# Patient Record
Sex: MALE | Race: WHITE | Employment: FULL TIME | URBAN - NONMETROPOLITAN AREA
[De-identification: names, ages, dates, MRNs, and addresses within clinical notes are randomized per-mention and may not be internally consistent; named-entity substitution may affect disease eponyms.]

---
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
## 2021-03-15 ENCOUNTER — HOSPITAL ENCOUNTER (EMERGENCY)
Age: 21
Discharge: HOME | End: 2021-03-15
Attending: EMERGENCY MEDICINE
Payer: COMMERCIAL

## 2021-03-15 VITALS
OXYGEN SATURATION: 98 % | HEART RATE: 88 BPM | TEMPERATURE: 98.2 F | BODY MASS INDEX: 21.67 KG/M2 | DIASTOLIC BLOOD PRESSURE: 74 MMHG | SYSTOLIC BLOOD PRESSURE: 129 MMHG | RESPIRATION RATE: 17 BRPM | WEIGHT: 160 LBS | HEIGHT: 72 IN

## 2021-03-15 DIAGNOSIS — H57.9 SENSATION OF FOREIGN BODY IN EYE: Primary | ICD-10-CM

## 2021-03-15 PROCEDURE — 2500000003 HC RX 250 WO HCPCS: Performed by: EMERGENCY MEDICINE

## 2021-03-15 PROCEDURE — 6370000000 HC RX 637 (ALT 250 FOR IP)

## 2021-03-15 PROCEDURE — 99284 EMERGENCY DEPT VISIT MOD MDM: CPT

## 2021-03-15 RX ORDER — GENTAMICIN SULFATE 3 MG/ML
SOLUTION/ DROPS OPHTHALMIC
Status: DISCONTINUED
Start: 2021-03-15 | End: 2021-03-15 | Stop reason: WASHOUT

## 2021-03-15 RX ORDER — PROPARACAINE HYDROCHLORIDE 5 MG/ML
1 SOLUTION/ DROPS OPHTHALMIC ONCE
Status: COMPLETED | OUTPATIENT
Start: 2021-03-15 | End: 2021-03-15

## 2021-03-15 RX ORDER — GENTAMICIN SULFATE 3 MG/ML
SOLUTION/ DROPS OPHTHALMIC
Status: COMPLETED
Start: 2021-03-15 | End: 2021-03-15

## 2021-03-15 RX ADMIN — GENTAMICIN SULFATE: 3 SOLUTION OPHTHALMIC at 21:55

## 2021-03-15 RX ADMIN — PROPARACAINE HYDROCHLORIDE 1 DROP: 5 SOLUTION/ DROPS OPHTHALMIC at 21:40

## 2021-03-15 ASSESSMENT — PAIN DESCRIPTION - LOCATION: LOCATION: EYE

## 2021-03-15 ASSESSMENT — PAIN SCALES - GENERAL: PAINLEVEL_OUTOF10: 4

## 2021-03-15 ASSESSMENT — VISUAL ACUITY: OU: 20/13

## 2021-03-15 NOTE — LETTER
[address]  [address] [place] [address] [address]  Phone: [phone]               March 15, 2021    Patient: Robinson Robertson   YOB: 2000   Date of Visit: 3/15/2021       To Whom It May Concern:    Shaila Hawk was seen and treated in our emergency department on 3/15/2021. He may return to work on 3/17/21.       Sincerely,       Sunshine Min RN         Signature:__________________________________

## 2021-03-16 NOTE — ED NOTES
Pt presents to the front window with complaints of left eye possible foreign body. 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 Slight redness noted.       Gabriela Espino RN  03/15/21 2133

## 2021-03-16 NOTE — ED PROVIDER NOTES
[address]  [address] [address]  Phone: Joseerikakiel 12 COMPLAINT    Chief Complaint   Patient presents with    Foreign Body in Eye     left eye issue. was hammering apoxy on concrete and something flew into his eye       HPI    Sharon Mathis is a 24 y.o. male who presents above-noted complaint. Patient was doing fine. He was having some epoxy on concrete and something flew up into his eye.   pain  to his left eye. Denies photophobia vision pains or other issues although has pain and discomfort to that eye. They subsequently use some saline to wash it out. Denies nausea vomiting    PAST MEDICAL HISTORY    History reviewed. No pertinent past medical history. SURGICAL HISTORY    Past Surgical History:   Procedure Laterality Date    SHOULDER SURGERY      TESTICLE SURGERY         CURRENT MEDICATIONS    Current Outpatient Rx   Medication Sig Dispense Refill    acetaminophen (TYLENOL) 325 MG tablet Take 325 mg by mouth as needed for Pain      ibuprofen (ADVIL;MOTRIN) 200 MG tablet Take 200 mg by mouth every 6 hours as needed for Pain         ALLERGIES    Allergies   Allergen Reactions    Zithromax [Azithromycin Dihydrate] Hives       FAMILY HISTORY    History reviewed. No pertinent family history.     SOCIAL HISTORY    Social History     Socioeconomic History    Marital status: Single     Spouse name: None    Number of children: None    Years of education: None    Highest education level: None   Occupational History    None   Social Needs    Financial resource strain: None    Food insecurity     Worry: None     Inability: None    Transportation needs     Medical: None     Non-medical: None   Tobacco Use    Smoking status: Never Smoker    Smokeless tobacco: Never Used   Substance and Sexual Activity    Alcohol use: No    Drug use: No    Sexual activity: None   Lifestyle    Physical activity     Days per week: None     Minutes per session: None    Stress: None   Relationships    Social connections     Talks on phone: None     Gets together: None     Attends Oriental orthodox service: None     Active member of club or organization: None     Attends meetings of clubs or organizations: None     Relationship status: None    Intimate partner violence     Fear of current or ex partner: None     Emotionally abused: None     Physically abused: None     Forced sexual activity: None   Other Topics Concern    None   Social History Narrative    None       REVIEW OF SYSTEMS    Positive for eye injury. No neck pain chest pain other trauma. All systems negative except as marked. PHYSICAL EXAM    VITAL SIGNS: /74   Pulse 88   Temp 98.2 °F (36.8 °C)   Resp 17   Ht 6' (1.829 m)   Wt 160 lb (72.6 kg)   SpO2 98%   BMI 21.70 kg/m²    Constitutional:  Alert not toxtic or ill, able to give coherent history  HENT:  Normocephalic, Atraumatic  Cervical Spine: Normal range of motion,  No stridor. Eyes:  No discharge or  Swelling, slight conjunctival injection the left eye. Extraocular movements are intact. Respiratory: No respiratory distress  Musculoskeletal:  Intact distal pulses, No edema, No tenderness, No cyanosis, No clubbing. Good range of motion in all major joints. No tenderness to palpation or major deformities noted. Integument:  Warm, Dry, No erythema, No rash (on exposed areas)   Neurologic:  Alert & appropriate   Psychiatric:  Affect normal    EKG                      RADIOLOGY    No orders to display       PROCEDURES    Ophthalmic was placed in the left eye. Complete relief of all symptoms. Fluorescein staining was negative; Alex's test is negative    CONSULTS:  None        ED COURSE & MEDICAL DECISION MAKING    Pertinent Labs & Imaging studies reviewed. (See chart for details)  Patient has left eye foreign body sensation. Clinical exam is otherwise benign.   No other focal findings on clinical exam.  He did irrigate his eye after the injury. He has no acute vision loss or other issues at this time. Counseled in regards to the foreign body. Possible other trauma. He has no signs of bleeding. Always worried about globe injury. Alex's test is negative. I would like to have close follow-up with ophthalmology tomorrow and reassess him to assure there is no other intraorbital issues although appears to be more superficial.  We will use gentamicin prophylactically. SCREENINGS  /74   Pulse 88   Temp 98.2 °F (36.8 °C)   Resp 17   Ht 6' (1.829 m)   Wt 160 lb (72.6 kg)   SpO2 98%   BMI 21.70 kg/m²      No orders to display       Screening For Hypertension and Follow-up (#317)  patient informed that blood pressure is normal but should always be re-assessed by primary care      Screening For Tobacco Use and Cessation Intervention (#226):   reports that he has never smoked. He has never used smokeless tobacco.  Non-smoker not applicable for screen    FINAL IMPRESSION    1.  Sensation of foreign body in eye         PATIENT REFERRED TO:  Nina Veloz MD  [address] [place] [id number] [phone]    Call in 1 day        DISCHARGE MEDICATIONS:  New Prescriptions    No medications on file           Laly Cazares MD  03/15/21 1495

## 2021-03-16 NOTE — ED NOTES
Discharge teaching and instructions for condition explained to patient. AVS reviewed. Went over prescriptions with patient. Patient voiced understanding regarding prescriptions, follow up appointments and care of self at home. Pt discharged to home in stable condition per mother's care.      Tra Hussein RN  03/15/21 4378

## 2021-06-01 ENCOUNTER — HOSPITAL ENCOUNTER (EMERGENCY)
Age: 21
Discharge: HOME | End: 2021-06-01
Payer: COMMERCIAL

## 2021-06-01 ENCOUNTER — APPOINTMENT (OUTPATIENT)
Dept: GENERAL RADIOLOGY | Age: 21
End: 2021-06-01
Payer: COMMERCIAL

## 2021-06-01 VITALS
DIASTOLIC BLOOD PRESSURE: 77 MMHG | BODY MASS INDEX: 21.7 KG/M2 | TEMPERATURE: 98.3 F | OXYGEN SATURATION: 100 % | HEART RATE: 78 BPM | SYSTOLIC BLOOD PRESSURE: 130 MMHG | WEIGHT: 160 LBS | RESPIRATION RATE: 18 BRPM

## 2021-06-01 DIAGNOSIS — S61.412A LACERATION OF LEFT HAND WITHOUT FOREIGN BODY, INITIAL ENCOUNTER: Primary | ICD-10-CM

## 2021-06-01 PROCEDURE — 73130 X-RAY EXAM OF HAND: CPT

## 2021-06-01 PROCEDURE — 90471 IMMUNIZATION ADMIN: CPT | Performed by: NURSE PRACTITIONER

## 2021-06-01 PROCEDURE — 90715 TDAP VACCINE 7 YRS/> IM: CPT | Performed by: NURSE PRACTITIONER

## 2021-06-01 PROCEDURE — 6360000002 HC RX W HCPCS: Performed by: NURSE PRACTITIONER

## 2021-06-01 PROCEDURE — 99282 EMERGENCY DEPT VISIT SF MDM: CPT

## 2021-06-01 RX ORDER — LIDOCAINE HYDROCHLORIDE 10 MG/ML
INJECTION, SOLUTION INFILTRATION; PERINEURAL
Status: DISCONTINUED
Start: 2021-06-01 | End: 2021-06-01 | Stop reason: HOSPADM

## 2021-06-01 RX ORDER — CEPHALEXIN 500 MG/1
500 CAPSULE ORAL 4 TIMES DAILY
Qty: 28 CAPSULE | Refills: 0 | Status: SHIPPED | OUTPATIENT
Start: 2021-06-01 | End: 2021-06-08

## 2021-06-01 RX ADMIN — TETANUS TOXOID, REDUCED DIPHTHERIA TOXOID AND ACELLULAR PERTUSSIS VACCINE, ADSORBED 0.5 ML: 5; 2.5; 8; 8; 2.5 SUSPENSION INTRAMUSCULAR at 09:42

## 2021-06-01 ASSESSMENT — PAIN DESCRIPTION - ORIENTATION: ORIENTATION: LEFT

## 2021-06-01 ASSESSMENT — PAIN DESCRIPTION - PAIN TYPE: TYPE: ACUTE PAIN

## 2021-06-01 ASSESSMENT — ENCOUNTER SYMPTOMS: COLOR CHANGE: 0

## 2021-06-01 ASSESSMENT — PAIN SCALES - GENERAL: PAINLEVEL_OUTOF10: 3

## 2021-06-01 ASSESSMENT — PAIN DESCRIPTION - LOCATION: LOCATION: HAND

## 2021-06-01 NOTE — ED PROVIDER NOTES
[facility] Emergency Department    CHIEF COMPLAINT       Chief Complaint   Patient presents with    Laceration     left hand     Nurses Notes reviewed and I agree except as noted in the HPI. HISTORY OF PRESENT ILLNESS    Faisal Temple is a 22 y.o. male who presents to the ED for evaluation of a laceration to his left little finger that he sustained approximately 1 hour PTA. Pt states that he was pulling the winch cable on a four espinal when it slipped and cut the base of his finger. No significant pain to the hand or fingers. No other injuries. Last Tetanus was in 2014. Denies wrist pain, numbness, weakness, joint swelling, and any other complaints. Pain description:  Onset: acute   Location: left little finger   Duration: today, since approximately 1 hour PTA   Character: laceration  Aggravating factors: none   Radiation: none   Timing: acute   Severity: mild     Experienced previously: No    HPI was provided by the patient. REVIEW OF SYSTEMS     Review of Systems   Constitutional: Negative for chills and fever. Musculoskeletal: Negative for arthralgias and joint swelling. Skin: Positive for wound. Negative for color change. Neurological: Negative for weakness and numbness. Hematological: Does not bruise/bleed easily. PAST MEDICAL HISTORY   History reviewed. No pertinent past medical history. SURGICALHISTORY      has a past surgical history that includes Testicle surgery and shoulder surgery. CURRENT MEDICATIONS       Discharge Medication List as of 6/1/2021 10:45 AM      CONTINUE these medications which have NOT CHANGED    Details   acetaminophen (TYLENOL) 325 MG tablet Take 325 mg by mouth as needed for PainHistorical Med      ibuprofen (ADVIL;MOTRIN) 200 MG tablet Take 200 mg by mouth every 6 hours as needed for PainHistorical Med             ALLERGIES     is allergic to zithromax [azithromycin dihydrate]. FAMILY HISTORY     has no family status information on file. family history is not on file. SOCIAL HISTORY       Social History     Socioeconomic History    Marital status: Single     Spouse name: Not on file    Number of children: Not on file    Years of education: Not on file    Highest education level: Not on file   Occupational History    Not on file   Tobacco Use    Smoking status: Never Smoker    Smokeless tobacco: Never Used   Substance and Sexual Activity    Alcohol use: No    Drug use: No    Sexual activity: Not on file   Other Topics Concern    Not on file   Social History Narrative    Not on file     Social Determinants of Health     Financial Resource Strain:     Difficulty of Paying Living Expenses:    Food Insecurity:     Worried About Running Out of Food in the Last Year:     [address] in the Last Year:    Transportation Needs:     Lack of Transportation (Medical):  Lack of Transportation (Non-Medical):    Physical Activity:     Days of Exercise per Week:     Minutes of Exercise per Session:    Stress:     Feeling of Stress :    Social Connections:     Frequency of Communication with Friends and Family:     Frequency of Social Gatherings with Friends and Family:     Attends Sikh Services:     Active Member of Clubs or Organizations:     Attends Club or Organization Meetings:     Marital Status:    Intimate Partner Violence:     Fear of Current or Ex-Partner:     Emotionally Abused:     Physically Abused:     Sexually Abused:        PHYSICAL EXAM     INITIAL VITALS:  weight is 160 lb (72.6 kg). His oral temperature is 98.3 °F (36.8 °C). His blood pressure is 130/77 and his pulse is 78. His respiration is 18 and oxygen saturation is 100%. Physical Exam  Vitals and nursing note reviewed. Constitutional:       Appearance: Normal appearance. He is well-developed. HENT:      Head: Normocephalic.       Right Ear: External ear normal.      Left Ear: External ear normal.      Nose: Nose normal.   Eyes: Conjunctiva/sclera: Conjunctivae normal.   Cardiovascular:      Rate and Rhythm: Normal rate and regular rhythm. Heart sounds: S1 normal and S2 normal.   Pulmonary:      Effort: Pulmonary effort is normal. No respiratory distress. Musculoskeletal:         General: Normal range of motion. Cervical back: Normal range of motion and neck supple. Comments: No tenderness to fingers or wrist bilaterally. Pt is able to flex and extend left fifth digit fully and oppose digits on left hand. Neurovascularly intact. Skin:     General: Skin is warm. Capillary Refill: Capillary refill takes less than 2 seconds. Coloration: Skin is not pale. Findings: No rash. Comments: Approximately 1.5 cm superficial, linear laceration along the MCP joint of the left fifth digit, palmar aspect. Neurological:      General: No focal deficit present. Mental Status: He is alert. Psychiatric:         Behavior: Behavior normal.         Thought Content: Thought content normal.         Judgment: Judgment normal.       DIFFERENTIAL DIAGNOSIS:   Laceration, abrasion, tendon laceration, foreign body    DIAGNOSTIC RESULTS     RADIOLOGY: non-plainfilm images(s) such as CT, Ultrasound and MRI are read by the radiologist.  Plain radiographic images are visualized and preliminarily interpreted by the emergency physician unless otherwise stated below. XR HAND LEFT (MIN 3 VIEWS)   Final Result   Soft tissue laceration. Possible small radiopaque foreign bodies at the site of the laceration. **This report has been created using voice recognition software. It may contain minor errors which are inherent in voice recognition technology. **      Final report electronically signed by Dr. Los Conrad on 6/1/2021 9:37 AM            LABS:   Labs Reviewed - No data to display    EMERGENCY DEPARTMENT COURSE:   Vitals:    Vitals:    06/01/21 0906 06/01/21 0911   BP: 130/77    Pulse: 78    Resp: 18    Temp:  98.3 °F (36.8 °C)   TempSrc:  Oral   SpO2: 100%    Weight: 160 lb (72.6 kg)        MDM    Patient was seen and evaluated in the emergency department, patient appeared to be in no acute distress, vital signs were reviewed, no significant findings noted. Physical exam was completed, there is a 1.5 cm laceration to the base of the left little finger, there is no decreased range of motion, no sensory deficits noted, wound is visibly soiled. Patient was asked to rinse off and the sink, the wound was then soaked in a Betadine peroxide water mixture. X-rays were reviewed, possible foreign bodies on the surface noted. Wound was further cleaned. See the procedure note below as sutures were used to close the wound. Patient tolerated this well. Tetanus was updated, patient replaced on Keflex for prophylaxis. Advised to return to the emergency department new or worsening signs and symptoms specific to tenosynovitis. Patient verbalized understanding plan of care. Medications   Tetanus-Diphth-Acell Pertussis (BOOSTRIX) injection 0.5 mL (0.5 mLs Intramuscular Given 6/1/21 0942)       Patient was seenindependently by myself. The patient's final impression and disposition and plan was determined by myself. CRITICAL CARE:   None    CONSULTS:  None    PROCEDURES:  Lac Repair    Date/Time: 6/1/2021 10:00 AM  Performed by: MARY Jon CNP  Authorized by: MARY Jon CNP     Consent:     Consent given by:  Patient    Risks discussed:  Pain, infection, retained foreign body, need for additional repair, tendon damage, nerve damage and poor wound healing    Alternatives discussed:  No treatment and delayed treatment  Anesthesia (see MAR for exact dosages):      Anesthesia method:  Local infiltration    Local anesthetic:  Lidocaine 1% w/o epi  Laceration details:     Location:  Hand    Hand location:  L palm    Length (cm):  1.5    Depth (mm):  3  Repair type:     Repair type:  Simple  Pre-procedure details:

## 2021-06-01 NOTE — ED NOTES
Pt to the ED with complaints of a laceration on his left hand. Pt states that at 0830 this morning he was pulling on a wench cable and his hand slipped, causing the laceration to his hand. Pt states that blood initially was dripping out of the wound, but bleeding is controlled at this time.       Breann Manuel RN  06/01/21 5326

## 2022-01-04 ENCOUNTER — HOSPITAL ENCOUNTER (EMERGENCY)
Age: 22
Discharge: HOME | End: 2022-01-04
Attending: EMERGENCY MEDICINE
Payer: COMMERCIAL

## 2022-01-04 VITALS
DIASTOLIC BLOOD PRESSURE: 87 MMHG | HEART RATE: 100 BPM | HEIGHT: 73 IN | SYSTOLIC BLOOD PRESSURE: 148 MMHG | OXYGEN SATURATION: 98 % | WEIGHT: 180 LBS | BODY MASS INDEX: 23.86 KG/M2 | RESPIRATION RATE: 16 BRPM | TEMPERATURE: 98.4 F

## 2022-01-04 DIAGNOSIS — J20.9 ACUTE BRONCHITIS DUE TO INFECTION: Primary | ICD-10-CM

## 2022-01-04 PROCEDURE — 99213 OFFICE O/P EST LOW 20 MIN: CPT

## 2022-01-04 PROCEDURE — 99213 OFFICE O/P EST LOW 20 MIN: CPT | Performed by: EMERGENCY MEDICINE

## 2022-01-04 PROCEDURE — G0463 HOSPITAL OUTPT CLINIC VISIT: HCPCS

## 2022-01-04 RX ORDER — DOXYCYCLINE HYCLATE 100 MG
100 TABLET ORAL 2 TIMES DAILY
Qty: 14 TABLET | Refills: 0 | Status: SHIPPED | OUTPATIENT
Start: 2022-01-04 | End: 2022-01-11

## 2022-01-04 RX ORDER — DEXTROMETHORPHAN HYDROBROMIDE AND PROMETHAZINE HYDROCHLORIDE 15; 6.25 MG/5ML; MG/5ML
5 SYRUP ORAL 4 TIMES DAILY PRN
Qty: 120 ML | Refills: 0 | Status: SHIPPED | OUTPATIENT
Start: 2022-01-04 | End: 2022-01-08

## 2022-01-04 ASSESSMENT — ENCOUNTER SYMPTOMS
NAUSEA: 0
SINUS PRESSURE: 0
TROUBLE SWALLOWING: 0
EYE DISCHARGE: 0
ROS SKIN COMMENTS: NO RASH OR BRUISING
COUGH: 0
VOMITING: 0
EYE REDNESS: 0
VOICE CHANGE: 0
DIARRHEA: 0
STRIDOR: 0
WHEEZING: 0
BACK PAIN: 0
SORE THROAT: 1
ABDOMINAL PAIN: 0
SHORTNESS OF BREATH: 0
EYE PAIN: 0

## 2022-01-04 NOTE — ED NOTES
To [facility] with complaints of cough, fever of 99 at home, and chest pain with deep breathing/cough.  Started 1/1     Andrzej Rizo RN  01/04/22 2182

## 2022-01-04 NOTE — ED PROVIDER NOTES
[id number]       Chief Complaint   Patient presents with    Cough    Fever    Chest Pain     when coughing or deep breathin       Nurses Notes reviewed and I agree except as noted in the HPI. HISTORY OF PRESENT ILLNESS   Marissa Verma is a 22 y.o. male who presents with 3-week history of cough productive of minimal sputum, lateral chest discomfort with coughing, fatigue, fever to 100. No shortness of breath, wheezing, stridor, abdominal pain, vomiting, hemoptysis, dizziness, syncope, mopped assist, rash, diarrhea,  symptoms. No history of asthma or pneumonia. Non-smoker  REVIEW OF SYSTEMS     Review of Systems   Constitutional: Positive for appetite change, fatigue and fever. Negative for chills and unexpected weight change. Decreased appetite fever 100 fatigue   HENT: Positive for congestion, postnasal drip and sore throat. Negative for ear discharge, ear pain, sinus pressure, sneezing, trouble swallowing and voice change. Congestion, scratchy throat   Eyes: Negative for pain, discharge and redness. No erythema or discharge   Respiratory: Negative for cough, shortness of breath, wheezing and stridor. Cough no shortness of breath or wheezing   Cardiovascular: Negative for chest pain and leg swelling. No chest pain or syncope   Gastrointestinal: Negative for abdominal pain, diarrhea, nausea and vomiting. No Abdominal pain or vomiting   Genitourinary: Negative for dysuria, frequency, hematuria and urgency. Musculoskeletal: Negative for arthralgias, back pain, myalgias and neck pain. Skin: Negative for rash. No rash or bruising   Neurological: Negative for dizziness, syncope, weakness and headaches. No headache   Hematological: Negative for adenopathy. Psychiatric/Behavioral: Negative for behavioral problems, confusion, sleep disturbance and suicidal ideas.  The patient is not nervous/anxious. Red and bold elements reviewed    PAST MEDICAL HISTORY   History reviewed. No pertinent past medical history. SURGICAL HISTORY     Patient  has a past surgical history that includes Testicle surgery and shoulder surgery. CURRENT MEDICATIONS       Discharge Medication List as of 1/4/2022 12:44 PM      CONTINUE these medications which have NOT CHANGED    Details   acetaminophen (TYLENOL) 325 MG tablet Take 325 mg by mouth as needed for PainHistorical Med      ibuprofen (ADVIL;MOTRIN) 200 MG tablet Take 200 mg by mouth every 6 hours as needed for PainHistorical Med             ALLERGIES     Patient is is allergic to zithromax [azithromycin dihydrate]. FAMILY HISTORY     Patient'sfamily history is not on file. SOCIAL HISTORY     Patient  reports that he has never smoked. He has never used smokeless tobacco. He reports that he does not drink alcohol and does not use drugs. PHYSICAL EXAM     ED TRIAGE VITALS  BP: (!) 148/87, Temp: 98.4 °F (36.9 °C), Pulse: 100, Resp: 16, SpO2: 98 %  Physical Exam  Vitals and nursing note reviewed. Constitutional:       General: He is not in acute distress. Appearance: He is well-developed. He is not ill-appearing. Comments: Dry cough, moist membranes, normal voice   HENT:      Head: Normocephalic and atraumatic. Right Ear: Tympanic membrane and external ear normal.      Left Ear: Tympanic membrane and external ear normal.      Nose: Nose normal.      Mouth/Throat:      Pharynx: No oropharyngeal exudate. Comments: Oropharynx normal  Eyes:      General: No scleral icterus. Right eye: No discharge. Left eye: No discharge. Extraocular Movements:      Right eye: Normal extraocular motion. Left eye: Normal extraocular motion. Conjunctiva/sclera: Conjunctivae normal.      Pupils: Pupils are equal, round, and reactive to light. Comments: Conjunctiva clear   Neck:      Thyroid: No thyromegaly.       Vascular: No JVD. Comments: No meningismus  Cardiovascular:      Rate and Rhythm: Normal rate and regular rhythm. Pulses: Normal pulses. Heart sounds: Normal heart sounds, S1 normal and S2 normal. No murmur heard. No friction rub. No gallop. Comments: No murmur  Pulmonary:      Effort: Pulmonary effort is normal. No tachypnea or respiratory distress. Breath sounds: No stridor. Rhonchi present. No decreased breath sounds, wheezing or rales. Comments: Dry cough diffuse rhonchi  Chest:      Chest wall: No tenderness. Abdominal:      General: Bowel sounds are normal. There is no distension. Palpations: Abdomen is soft. There is no mass. Tenderness: There is no abdominal tenderness. There is no guarding or rebound. Comments: Soft nontender   Musculoskeletal:         General: No tenderness. Normal range of motion. Cervical back: Normal range of motion. Comments: Extremities normal   Lymphadenopathy:      Cervical: No cervical adenopathy. Right cervical: No superficial cervical adenopathy. Left cervical: No superficial cervical adenopathy. Skin:     General: Skin is warm and dry. Findings: No erythema or rash. Comments: No rash or bruising   Neurological:      Mental Status: He is alert and oriented to person, place, and time. Cranial Nerves: No cranial nerve deficit. Motor: No abnormal muscle tone. Coordination: Coordination normal.      Deep Tendon Reflexes: Reflexes are normal and symmetric. Reflexes normal.      Comments: Appropriate, no focal finding   Psychiatric:         Behavior: Behavior normal.         Thought Content: Thought content normal.         Judgment: Judgment normal.         DIAGNOSTIC RESULTS   Labs: No results found for this visit on 01/04/22.     IMAGING:  No orders to display     URGENT CARE COURSE:     Vitals:    01/04/22 1155   BP: (!) 148/87   Pulse: 100   Resp: 16   Temp: 98.4 °F (36.9 °C)   TempSrc: Temporal SpO2: 98%   Weight: 180 lb (81.6 kg)   Height: 6' 1\" (1.854 m)       Medications - No data to display  PROCEDURES:  None  FINALIMPRESSION      1. Acute bronchitis due to infection        DISPOSITION/PLAN   DISPOSITION Decision To Discharge 01/04/2022 12:41:34 PM  Nontoxic, well-hydrated, normal airway. No airway abscess or epiglottitis, sepsis, CNS infection, pneumonia, hypoxia or bronchospasm. Patient has acute bronchitis. Will treat with doxycycline, Phenergan DM, Tylenol, increased oral clear liquids, vaporizer, rest.  Patient to follow-up with PCP in 6 days if problems persist, and he understands to go to ED if worse.   PATIENT REFERRED TO:  Zac [address]  [address]  [phone]    Schedule an appointment as soon as possible for a visit in 6 days  Recheck if problems persist, go to emergency if worse    DISCHARGE MEDICATIONS:  Discharge Medication List as of 1/4/2022 12:44 PM      START taking these medications    Details   doxycycline hyclate (VIBRA-TABS) 100 MG tablet Take 1 tablet by mouth 2 times daily for 7 days, Disp-14 tablet, R-0Print      promethazine-dextromethorphan (PROMETHAZINE-DM) 6.25-15 MG/5ML syrup Take 5 mLs by mouth 4 times daily as needed for Cough Caution will cause drowsiness do not take at work, Disp-120 mL, R-0Print           Discharge Medication List as of 1/4/2022 12:44 PM          MD Travis Kamara MD  01/04/22 63 Krystle Judge MD  01/04/22 8826

## 2022-05-03 ENCOUNTER — TELEPHONE (OUTPATIENT)
Dept: FAMILY MEDICINE CLINIC | Age: 22
End: 2022-05-03

## 2022-05-03 NOTE — TELEPHONE ENCOUNTER
----- Message from Kaycee Heller sent at 5/3/2022  3:02 PM EDT -----  Subject: Appointment Request    Reason for Call: New Patient Request Appointment    QUESTIONS  Type of Appointment? New Patient/New to Provider  Reason for appointment request? No appointments available during search  Additional Information for Provider? Would like to establish care with JR   Please advise if an appointment can be made. Mother is a patient currently   at office   ---------------------------------------------------------------------------  --------------  [id number] [employer]  What is the best way for the office to contact you? OK to leave message on   voicemail  Preferred Call Back Phone Number? [phone]  ---------------------------------------------------------------------------  --------------  SCRIPT ANSWERS  Relationship to Patient? Parent  Representative Name? Gina Mom   Additional information verified (besides Name and Date of Birth)? Phone   Number  Specialty Confirmation? Primary Care  Is this the first appointment to establish care for a ? No  Have you been diagnosed with, awaiting test results for, or told that you   are suspected of having COVID-19 (Coronavirus)? (If patient has tested   negative or was tested as a requirement for work, school, or travel and   not based on symptoms, answer no)? No  Within the past 10 days have you developed any of the following symptoms   (answer no if symptoms have been present longer than 10 days or began   more than 10 days ago)? Fever or Chills, Cough, Shortness of breath or   difficulty breathing, Loss of taste or smell, Sore throat, Nasal   congestion, Sneezing or runny nose, Fatigue or generalized body aches   (answer no if pain is specific to a body part e.g. back pain), Diarrhea,   Headache? No  Have you had close contact with someone with COVID-19 in the last 7 days? No  (Service Expert  click yes below to proceed with [facility]   Scheduling)?  Yes

## 2022-05-09 ENCOUNTER — OFFICE VISIT (OUTPATIENT)
Dept: FAMILY MEDICINE CLINIC | Age: 22
End: 2022-05-09
Payer: COMMERCIAL

## 2022-05-09 VITALS
OXYGEN SATURATION: 99 % | RESPIRATION RATE: 18 BRPM | DIASTOLIC BLOOD PRESSURE: 78 MMHG | HEIGHT: 72 IN | SYSTOLIC BLOOD PRESSURE: 144 MMHG | HEART RATE: 66 BPM | TEMPERATURE: 98.1 F | WEIGHT: 185 LBS | BODY MASS INDEX: 25.06 KG/M2

## 2022-05-09 DIAGNOSIS — I10 PRIMARY HYPERTENSION: Primary | ICD-10-CM

## 2022-05-09 DIAGNOSIS — Z13.1 SCREENING FOR DIABETES MELLITUS: ICD-10-CM

## 2022-05-09 DIAGNOSIS — Z13.220 SCREENING FOR LIPID DISORDERS: ICD-10-CM

## 2022-05-09 DIAGNOSIS — Z13.29 SCREENING FOR THYROID DISORDER: ICD-10-CM

## 2022-05-09 PROCEDURE — 99203 OFFICE O/P NEW LOW 30 MIN: CPT | Performed by: NURSE PRACTITIONER

## 2022-05-09 RX ORDER — LISINOPRIL 10 MG/1
10 TABLET ORAL DAILY
Qty: 30 TABLET | Refills: 3 | Status: SHIPPED | OUTPATIENT
Start: 2022-05-09 | End: 2022-05-31

## 2022-05-09 SDOH — ECONOMIC STABILITY: FOOD INSECURITY: WITHIN THE PAST 12 MONTHS, YOU WORRIED THAT YOUR FOOD WOULD RUN OUT BEFORE YOU GOT MONEY TO BUY MORE.: NEVER TRUE

## 2022-05-09 SDOH — ECONOMIC STABILITY: FOOD INSECURITY: WITHIN THE PAST 12 MONTHS, THE FOOD YOU BOUGHT JUST DIDN'T LAST AND YOU DIDN'T HAVE MONEY TO GET MORE.: NEVER TRUE

## 2022-05-09 ASSESSMENT — ENCOUNTER SYMPTOMS
BACK PAIN: 0
SORE THROAT: 0
ABDOMINAL PAIN: 0
NAUSEA: 0
STRIDOR: 0
SINUS PRESSURE: 0
DIARRHEA: 0
WHEEZING: 0
CHEST TIGHTNESS: 0
SHORTNESS OF BREATH: 0
ABDOMINAL DISTENTION: 0
CONSTIPATION: 0
VOMITING: 0
COUGH: 0
COLOR CHANGE: 0

## 2022-05-09 ASSESSMENT — PATIENT HEALTH QUESTIONNAIRE - PHQ9
SUM OF ALL RESPONSES TO PHQ9 QUESTIONS 1 & 2: 0
SUM OF ALL RESPONSES TO PHQ QUESTIONS 1-9: 0
1. LITTLE INTEREST OR PLEASURE IN DOING THINGS: 0
2. FEELING DOWN, DEPRESSED OR HOPELESS: 0
SUM OF ALL RESPONSES TO PHQ QUESTIONS 1-9: 0

## 2022-05-09 ASSESSMENT — SOCIAL DETERMINANTS OF HEALTH (SDOH): HOW HARD IS IT FOR YOU TO PAY FOR THE VERY BASICS LIKE FOOD, HOUSING, MEDICAL CARE, AND HEATING?: NOT HARD AT ALL

## 2022-05-09 NOTE — PROGRESS NOTES
Jesus Dukes (:  2000) is a 25 y.o. male,Established patient, here for evaluation of the following chief complaint(s):  Established New Doctor (elevated BP; tremor in Bilat UE )         ASSESSMENT/PLAN:  1. Primary hypertension  -     CBC with Auto Differential; Future  -     Comprehensive Metabolic Panel; Future  2. Screening for lipid disorders  -     Lipid Panel; Future  3. Screening for thyroid disorder  -     TSH with Reflex; Future  4. Screening for diabetes mellitus  -     Comprehensive Metabolic Panel; Future    Obtain blood work  Start lisinopril  Monitor BP    Return in about 6 weeks (around 2022). Subjective   SUBJECTIVE/OBJECTIVE:  HPI    Last seen Dr. Tomy Lovett  BP has been running high. No prior health problems. BP has been this way for a little while. No cp or sob. BP Readings from Last 3 Encounters:   22 (!) 144/78   22 (!) 148/87   21 130/77         Review of Systems   Constitutional: Negative for activity change, appetite change, chills, diaphoresis, fatigue, fever and unexpected weight change. HENT: Negative for congestion, ear pain, postnasal drip, sinus pressure and sore throat. Eyes: Negative for visual disturbance. Respiratory: Negative for cough, chest tightness, shortness of breath, wheezing and stridor. Cardiovascular: Negative for chest pain, palpitations and leg swelling. Gastrointestinal: Negative for abdominal distention, abdominal pain, constipation, diarrhea, nausea and vomiting. Endocrine: Negative for polydipsia, polyphagia and polyuria. Genitourinary: Negative for decreased urine volume, difficulty urinating, dysuria, flank pain, frequency, hematuria and urgency. Musculoskeletal: Negative for arthralgias, back pain, gait problem, joint swelling, myalgias and neck pain. Skin: Negative for color change, pallor and rash.    Neurological: Negative for dizziness, syncope, weakness, light-headedness, numbness and headaches. Hematological: Negative for adenopathy. Psychiatric/Behavioral: Negative for behavioral problems, self-injury and sleep disturbance. The patient is not nervous/anxious. Objective   Physical Exam  Vitals reviewed. Constitutional:       General: He is not in acute distress. Appearance: Normal appearance. He is well-developed. HENT:      Head: Normocephalic and atraumatic. Right Ear: Tympanic membrane and external ear normal.      Left Ear: Tympanic membrane and external ear normal.      Nose: Nose normal.      Right Sinus: No maxillary sinus tenderness. Left Sinus: No maxillary sinus tenderness. Mouth/Throat:      Mouth: Mucous membranes are moist.      Pharynx: Oropharynx is clear. Uvula midline. No oropharyngeal exudate or posterior oropharyngeal erythema. Neck:      Trachea: Trachea normal.   Cardiovascular:      Rate and Rhythm: Normal rate and regular rhythm. Heart sounds: Normal heart sounds. No murmur heard. Pulmonary:      Effort: Pulmonary effort is normal. No respiratory distress. Breath sounds: Normal breath sounds. No decreased breath sounds, wheezing, rhonchi or rales. Chest:      Chest wall: No tenderness. Abdominal:      General: There is no distension. Palpations: Abdomen is soft. There is no mass. Tenderness: There is no abdominal tenderness. Musculoskeletal:         General: No tenderness or deformity. Normal range of motion. Cervical back: Normal range of motion and neck supple. Lymphadenopathy:      Cervical: No cervical adenopathy. Skin:     General: Skin is warm and dry. Neurological:      Mental Status: He is alert and oriented to person, place, and time. Motor: No abnormal muscle tone. Coordination: Coordination normal.      Gait: Gait normal.   Psychiatric:         Mood and Affect: Mood normal.         Behavior: Behavior normal.         Thought Content:  Thought content normal. Judgment: Judgment normal.            On this date 5/9/2022 I have spent 35 minutes reviewing previous notes, test results and face to face with the patient discussing the diagnosis and importance of compliance with the treatment plan as well as documenting on the day of the visit. An electronic signature was used to authenticate this note.     --Barby Epstein, APRN - CNP

## 2022-05-10 ENCOUNTER — NURSE ONLY (OUTPATIENT)
Dept: FAMILY MEDICINE CLINIC | Age: 22
End: 2022-05-10
Payer: COMMERCIAL

## 2022-05-10 DIAGNOSIS — Z13.220 SCREENING FOR LIPID DISORDERS: ICD-10-CM

## 2022-05-10 DIAGNOSIS — Z13.29 SCREENING FOR THYROID DISORDER: ICD-10-CM

## 2022-05-10 DIAGNOSIS — I10 PRIMARY HYPERTENSION: ICD-10-CM

## 2022-05-10 DIAGNOSIS — Z13.1 SCREENING FOR DIABETES MELLITUS: ICD-10-CM

## 2022-05-10 LAB
ALBUMIN SERPL-MCNC: 5.5 G/DL (ref 3.5–5.1)
ALP BLD-CCNC: 102 U/L (ref 38–126)
ALT SERPL-CCNC: 12 U/L (ref 11–66)
ANION GAP SERPL CALCULATED.3IONS-SCNC: 13 MEQ/L (ref 8–16)
AST SERPL-CCNC: 21 U/L (ref 5–40)
BASOPHILS # BLD: 0.7 %
BASOPHILS ABSOLUTE: 0 THOU/MM3 (ref 0–0.1)
BILIRUB SERPL-MCNC: 0.7 MG/DL (ref 0.3–1.2)
BUN BLDV-MCNC: 15 MG/DL (ref 7–22)
CALCIUM SERPL-MCNC: 9.6 MG/DL (ref 8.5–10.5)
CHLORIDE BLD-SCNC: 100 MEQ/L (ref 98–111)
CHOLESTEROL, TOTAL: 180 MG/DL (ref 100–199)
CO2: 26 MEQ/L (ref 23–33)
CREAT SERPL-MCNC: 0.8 MG/DL (ref 0.4–1.2)
EOSINOPHIL # BLD: 1.3 %
EOSINOPHILS ABSOLUTE: 0.1 THOU/MM3 (ref 0–0.4)
ERYTHROCYTE [DISTWIDTH] IN BLOOD BY AUTOMATED COUNT: 13.4 % (ref 11.5–14.5)
ERYTHROCYTE [DISTWIDTH] IN BLOOD BY AUTOMATED COUNT: 43.4 FL (ref 35–45)
GFR SERPL CREATININE-BSD FRML MDRD: > 90 ML/MIN/1.73M2
GLUCOSE BLD-MCNC: 83 MG/DL (ref 70–108)
HCT VFR BLD CALC: 47.3 % (ref 42–52)
HDLC SERPL-MCNC: 51 MG/DL
HEMOGLOBIN: 14.9 GM/DL (ref 14–18)
IMMATURE GRANS (ABS): 0.02 THOU/MM3 (ref 0–0.07)
IMMATURE GRANULOCYTES: 0.3 %
LDL CHOLESTEROL CALCULATED: 106 MG/DL
LYMPHOCYTES # BLD: 29.1 %
LYMPHOCYTES ABSOLUTE: 2 THOU/MM3 (ref 1–4.8)
MCH RBC QN AUTO: 27.9 PG (ref 26–33)
MCHC RBC AUTO-ENTMCNC: 31.5 GM/DL (ref 32.2–35.5)
MCV RBC AUTO: 88.6 FL (ref 80–94)
MONOCYTES # BLD: 11.4 %
MONOCYTES ABSOLUTE: 0.8 THOU/MM3 (ref 0.4–1.3)
NUCLEATED RED BLOOD CELLS: 0 /100 WBC
PLATELET # BLD: 289 THOU/MM3 (ref 130–400)
PMV BLD AUTO: 9 FL (ref 9.4–12.4)
POTASSIUM SERPL-SCNC: 5 MEQ/L (ref 3.5–5.2)
RBC # BLD: 5.34 MILL/MM3 (ref 4.7–6.1)
SEG NEUTROPHILS: 57.2 %
SEGMENTED NEUTROPHILS ABSOLUTE COUNT: 4 THOU/MM3 (ref 1.8–7.7)
SODIUM BLD-SCNC: 139 MEQ/L (ref 135–145)
TOTAL PROTEIN: 8.1 G/DL (ref 6.1–8)
TRIGL SERPL-MCNC: 113 MG/DL (ref 0–199)
TSH SERPL DL<=0.05 MIU/L-ACNC: 2.12 UIU/ML (ref 0.4–4.2)
WBC # BLD: 7 THOU/MM3 (ref 4.8–10.8)

## 2022-05-10 PROCEDURE — 99999 PR OFFICE/OUTPT VISIT,PROCEDURE ONLY: CPT | Performed by: NURSE PRACTITIONER

## 2022-05-10 PROCEDURE — 36415 COLL VENOUS BLD VENIPUNCTURE: CPT | Performed by: NURSE PRACTITIONER

## 2022-05-25 ENCOUNTER — APPOINTMENT (OUTPATIENT)
Dept: GENERAL RADIOLOGY | Age: 22
End: 2022-05-25
Payer: COMMERCIAL

## 2022-05-25 ENCOUNTER — HOSPITAL ENCOUNTER (EMERGENCY)
Age: 22
Discharge: HOME | End: 2022-05-25
Payer: COMMERCIAL

## 2022-05-25 VITALS
SYSTOLIC BLOOD PRESSURE: 139 MMHG | RESPIRATION RATE: 16 BRPM | DIASTOLIC BLOOD PRESSURE: 83 MMHG | HEART RATE: 87 BPM | TEMPERATURE: 98 F | OXYGEN SATURATION: 99 %

## 2022-05-25 DIAGNOSIS — S61.210A LACERATION OF RIGHT INDEX FINGER WITHOUT FOREIGN BODY WITHOUT DAMAGE TO NAIL, INITIAL ENCOUNTER: Primary | ICD-10-CM

## 2022-05-25 PROCEDURE — 99283 EMERGENCY DEPT VISIT LOW MDM: CPT

## 2022-05-25 PROCEDURE — 73140 X-RAY EXAM OF FINGER(S): CPT

## 2022-05-25 PROCEDURE — 12001 RPR S/N/AX/GEN/TRNK 2.5CM/<: CPT

## 2022-05-25 PROCEDURE — 6370000000 HC RX 637 (ALT 250 FOR IP): Performed by: NURSE PRACTITIONER

## 2022-05-25 RX ORDER — CEPHALEXIN 500 MG/1
500 CAPSULE ORAL 3 TIMES DAILY
Qty: 21 CAPSULE | Refills: 0 | Status: SHIPPED | OUTPATIENT
Start: 2022-05-25 | End: 2022-06-01

## 2022-05-25 RX ADMIN — Medication 3 ML: at 19:05

## 2022-05-25 ASSESSMENT — PAIN - FUNCTIONAL ASSESSMENT: PAIN_FUNCTIONAL_ASSESSMENT: NONE - DENIES PAIN

## 2022-05-25 NOTE — ED TRIAGE NOTES
Pt presents to the ED via lobby with c/o right index finger laceration. Pt states he was splitting wood and accidentally hit his finger on the wood splitter.  Bleeding controlled upon arrival

## 2022-05-26 ENCOUNTER — TELEPHONE (OUTPATIENT)
Dept: FAMILY MEDICINE CLINIC | Age: 22
End: 2022-05-26

## 2022-05-27 ASSESSMENT — ENCOUNTER SYMPTOMS
ABDOMINAL PAIN: 0
NAUSEA: 0
COUGH: 0
VOMITING: 0
CHEST TIGHTNESS: 0
BACK PAIN: 0
RHINORRHEA: 0
EYE REDNESS: 0

## 2022-05-27 NOTE — ED PROVIDER NOTES
below.  XR FINGER RIGHT (MIN 2 VIEWS)   Final Result   Impression:   Normal right index finger. This document has been electronically signed by: Susanna Vargas MD on    05/25/2022 06:31 PM            LABS:   Labs Reviewed - No data to display    EMERGENCY DEPARTMENT COURSE:   Vitals:    Vitals:    05/25/22 1748   BP: 139/83   Pulse: 87   Resp: 16   Temp: 98 °F (36.7 °C)   SpO2: 99%                                   Internal Administration   First Dose      Second Dose           Last COVID Lab No results found for: SARS-COV-2, SARS-COV-2 RNA, SARS-COV-2, SARS-COV-2, SARS-COV-2 BY PCR, SARS-COV-2, SARS-COV-2, SARS-COV-2         MDM    Patient was seen in the ER for a laceration to the right index finger. Imaging is obtained. No fractures. Tetanus is UTD. Wound closed per the procedure note. Wound is dressed and patient is given wound care instructions. Follow up with pcp and return for new or worsening symptoms. No notes of  Admission Criteria type on file. Medications   lidocaine-EPINEPHrine-tetracaine (LET) topical solution 3 mL syringe (3 mLs Topical Given 5/25/22 1905)       Please note that the patient was evaluated during a pandemic. All efforts were made for HIPPA compliance as well as provision of appropriate care. Patient was seen independently by myself. The patient's final impression and disposition and plan was determined by myself. Strict return precautions and follow up instructions were discussed with the patient prior to discharge, with which the patient agrees. Physical assessment findings, diagnostic testing(s) if applicable, and vital signs reviewed with patient/patient representative. Questions answered. Medications asdirected, including OTC medications for supportive care. Education provided on medications. Differential diagnosis(s) discussed with patient/patient representative.   Home care/self care instructions reviewed withpatient/patient representative. Patient is to follow-up with family care provider in 2-3 days if no improvement. Patient is to go to the emergency department if symptoms worsen. Patient/patient representative isaware of care plan, questions answered, verbalizes understanding and is in agreement. CRITICAL CARE:   None    CONSULTS:  None    PROCEDURES:  Lac Repair    Date/Time: 5/27/2022 3:56 AM  Performed by: MARY Castaneda CNP  Authorized by: MARY Castaneda CNP     Consent:     Consent obtained:  Verbal    Consent given by:  Patient    Risks discussed:  Infection, pain, tendon damage, vascular damage, poor wound healing, poor cosmetic result, need for additional repair and nerve damage    Alternatives discussed:  No treatment, delayed treatment, observation and referral  Anesthesia (see MAR for exact dosages):      Anesthesia method:  Topical application    Topical anesthetic:  LET  Laceration details:     Location:  Finger    Finger location:  R index finger    Length (cm):  2  Repair type:     Repair type:  Simple  Pre-procedure details:     Preparation:  Patient was prepped and draped in usual sterile fashion and imaging obtained to evaluate for foreign bodies  Exploration:     Hemostasis achieved with:  LET    Wound exploration: wound explored through full range of motion and entire depth of wound probed and visualized      Wound extent: no fascia violation noted, no foreign bodies/material noted, no muscle damage noted, no nerve damage noted, no tendon damage noted, no underlying fracture noted and no vascular damage noted      Contaminated: no    Treatment:     Area cleansed with:  Shnathaly-Clens    Amount of cleaning:  Standard  Skin repair:     Repair method:  Sutures    Suture size:  4-0    Suture material:  Nylon    Suture technique:  Simple interrupted and vertical mattress    Number of sutures:  3  Approximation:     Approximation:  Close  Post-procedure details:     Dressing:  Non-adherent dressing    Patient tolerance of procedure: Tolerated well, no immediate complications        FINAL IMPRESSION     1.  Laceration of right index finger without foreign body without damage to nail, initial encounter          DISPOSITION/PLAN   DISPOSITION Decision To Discharge 05/25/2022 07:44:35 PM      PATIENT REFERREDTO:  MARY Bro CNP  [address] [place]  [phone]    Schedule an appointment as soon as possible for a visit in 3 days  For wound re-check, For follow up      DISCHARGE MEDICATIONS:  Discharge Medication List as of 5/25/2022  7:47 PM      START taking these medications    Details   cephALEXin (KEFLEX) 500 MG capsule Take 1 capsule by mouth 3 times daily for 7 days, Disp-21 capsule, R-0Normal             (Please note that portions of this note were completed with a voice recognition program.  Efforts were made to edit the dictations but occasionally words are mis-transcribed.)         MARY Flynn CNP, APRN - CNP  05/27/22 9623

## 2022-05-31 RX ORDER — LISINOPRIL 10 MG/1
TABLET ORAL
Qty: 30 TABLET | Refills: 3 | Status: SHIPPED | OUTPATIENT
Start: 2022-05-31 | End: 2022-07-28 | Stop reason: SDUPTHER

## 2022-07-28 ENCOUNTER — OFFICE VISIT (OUTPATIENT)
Dept: FAMILY MEDICINE CLINIC | Age: 22
End: 2022-07-28
Payer: COMMERCIAL

## 2022-07-28 VITALS
RESPIRATION RATE: 18 BRPM | SYSTOLIC BLOOD PRESSURE: 118 MMHG | OXYGEN SATURATION: 99 % | HEIGHT: 72 IN | DIASTOLIC BLOOD PRESSURE: 62 MMHG | HEART RATE: 77 BPM | WEIGHT: 185 LBS | BODY MASS INDEX: 25.06 KG/M2

## 2022-07-28 DIAGNOSIS — I10 PRIMARY HYPERTENSION: Primary | ICD-10-CM

## 2022-07-28 PROCEDURE — 99213 OFFICE O/P EST LOW 20 MIN: CPT | Performed by: NURSE PRACTITIONER

## 2022-07-28 RX ORDER — LISINOPRIL 10 MG/1
TABLET ORAL
Qty: 30 TABLET | Refills: 11 | Status: SHIPPED | OUTPATIENT
Start: 2022-07-28

## 2022-07-28 NOTE — PROGRESS NOTES
Isa Dawkins (:  2000) is a 25 y.o. male,Established patient, here for evaluation of the following chief complaint(s):  Medication Check         ASSESSMENT/PLAN:  1. Primary hypertension    Check BMP   Continue lisiniropril   BP normal.      Return in about 1 year (around 2023). Subjective   SUBJECTIVE/OBJECTIVE:  HPI    HTN. Doing good on lisinopril. No cough. Does check BP at home and usually running good. No cp or sob. Potassium was 5 last checked at start of lisinopril. Review of Systems   Constitutional:  Negative for activity change, appetite change, chills, diaphoresis, fatigue, fever and unexpected weight change. HENT:  Negative for congestion, ear pain, postnasal drip, sinus pressure and sore throat. Eyes:  Negative for visual disturbance. Respiratory:  Negative for cough, chest tightness, shortness of breath, wheezing and stridor. Cardiovascular:  Negative for chest pain, palpitations and leg swelling. Gastrointestinal:  Negative for abdominal distention, abdominal pain, constipation, diarrhea, nausea and vomiting. Endocrine: Negative for polydipsia, polyphagia and polyuria. Genitourinary:  Negative for decreased urine volume, difficulty urinating, dysuria, flank pain, frequency, hematuria and urgency. Musculoskeletal:  Negative for arthralgias, back pain, gait problem, joint swelling, myalgias and neck pain. Skin:  Negative for color change, pallor and rash. Neurological:  Negative for dizziness, syncope, weakness, light-headedness, numbness and headaches. Hematological:  Negative for adenopathy. Psychiatric/Behavioral:  Negative for behavioral problems, self-injury and sleep disturbance. The patient is not nervous/anxious. Objective   Physical Exam  Vitals reviewed. Constitutional:       General: He is not in acute distress. Appearance: Normal appearance. He is well-developed. HENT:      Head: Normocephalic.       Right Ear: External ear normal.      Left Ear: External ear normal.      Nose: Nose normal.      Right Sinus: No maxillary sinus tenderness. Left Sinus: No maxillary sinus tenderness. Mouth/Throat:      Pharynx: Uvula midline. Neck:      Trachea: Trachea normal.   Cardiovascular:      Rate and Rhythm: Normal rate and regular rhythm. Heart sounds: Normal heart sounds. No murmur heard. Pulmonary:      Effort: Pulmonary effort is normal. No respiratory distress. Breath sounds: Normal breath sounds. No decreased breath sounds, wheezing, rhonchi or rales. Chest:      Chest wall: No tenderness. Abdominal:      General: There is no distension. Palpations: Abdomen is soft. There is no mass. Tenderness: There is no abdominal tenderness. Musculoskeletal:         General: No tenderness or deformity. Normal range of motion. Cervical back: Normal range of motion and neck supple. Lymphadenopathy:      Cervical: No cervical adenopathy. Skin:     General: Skin is warm and dry. Neurological:      Mental Status: He is alert and oriented to person, place, and time. Motor: No abnormal muscle tone. Coordination: Coordination normal.      Gait: Gait normal.   Psychiatric:         Mood and Affect: Mood normal.         Behavior: Behavior normal.         Thought Content: Thought content normal.         Judgment: Judgment normal.          On this date 7/28/2022 I have spent 20 minutes reviewing previous notes, test results and face to face with the patient discussing the diagnosis and importance of compliance with the treatment plan as well as documenting on the day of the visit. An electronic signature was used to authenticate this note.     --MARY Boswell - CNP

## 2022-08-02 ENCOUNTER — HOSPITAL ENCOUNTER (OUTPATIENT)
Age: 22
Discharge: HOME | End: 2022-08-02
Payer: COMMERCIAL

## 2022-08-02 DIAGNOSIS — I10 PRIMARY HYPERTENSION: ICD-10-CM

## 2022-08-02 PROCEDURE — 36415 COLL VENOUS BLD VENIPUNCTURE: CPT

## 2022-08-02 PROCEDURE — 80048 BASIC METABOLIC PNL TOTAL CA: CPT

## 2022-08-03 LAB
ANION GAP SERPL CALCULATED.3IONS-SCNC: 13 MEQ/L (ref 8–16)
BUN BLDV-MCNC: 19 MG/DL (ref 7–22)
CALCIUM SERPL-MCNC: 9.7 MG/DL (ref 8.5–10.5)
CHLORIDE BLD-SCNC: 100 MEQ/L (ref 98–111)
CO2: 24 MEQ/L (ref 23–33)
CREAT SERPL-MCNC: 1.2 MG/DL (ref 0.4–1.2)
GFR SERPL CREATININE-BSD FRML MDRD: 76 ML/MIN/1.73M2
GLUCOSE BLD-MCNC: 83 MG/DL (ref 70–108)
POTASSIUM SERPL-SCNC: 3.9 MEQ/L (ref 3.5–5.2)
SODIUM BLD-SCNC: 137 MEQ/L (ref 135–145)

## 2022-08-03 ASSESSMENT — ENCOUNTER SYMPTOMS
BACK PAIN: 0
SINUS PRESSURE: 0
STRIDOR: 0
NAUSEA: 0
COUGH: 0
ABDOMINAL PAIN: 0
ABDOMINAL DISTENTION: 0
CHEST TIGHTNESS: 0
VOMITING: 0
SHORTNESS OF BREATH: 0
WHEEZING: 0
DIARRHEA: 0
SORE THROAT: 0
CONSTIPATION: 0
COLOR CHANGE: 0

## 2022-08-23 ENCOUNTER — OFFICE VISIT (OUTPATIENT)
Dept: FAMILY MEDICINE CLINIC | Age: 22
End: 2022-08-23
Payer: COMMERCIAL

## 2022-08-23 VITALS
HEIGHT: 72 IN | HEART RATE: 88 BPM | BODY MASS INDEX: 25.19 KG/M2 | SYSTOLIC BLOOD PRESSURE: 128 MMHG | TEMPERATURE: 97.9 F | DIASTOLIC BLOOD PRESSURE: 62 MMHG | OXYGEN SATURATION: 99 % | WEIGHT: 186 LBS | RESPIRATION RATE: 16 BRPM

## 2022-08-23 DIAGNOSIS — Z00.00 ANNUAL PHYSICAL EXAM: Primary | ICD-10-CM

## 2022-08-23 PROCEDURE — 99395 PREV VISIT EST AGE 18-39: CPT | Performed by: NURSE PRACTITIONER

## 2022-08-23 ASSESSMENT — ENCOUNTER SYMPTOMS
SORE THROAT: 0
COLOR CHANGE: 0
ABDOMINAL DISTENTION: 0
WHEEZING: 0
VOMITING: 0
STRIDOR: 0
CHEST TIGHTNESS: 0
DIARRHEA: 0
SINUS PRESSURE: 0
BACK PAIN: 0
ABDOMINAL PAIN: 0
CONSTIPATION: 0
NAUSEA: 0
COUGH: 0
SHORTNESS OF BREATH: 0

## 2022-08-23 NOTE — PROGRESS NOTES
2022    Sarika Moore (:  2000) is a 25 y.o. male, here for a preventive medicine evaluation. There is no problem list on file for this patient. Review of Systems   Constitutional:  Negative for activity change, appetite change, chills, diaphoresis, fatigue, fever and unexpected weight change. HENT:  Negative for congestion, ear pain, postnasal drip, sinus pressure and sore throat. Eyes:  Negative for visual disturbance. Respiratory:  Negative for cough, chest tightness, shortness of breath, wheezing and stridor. Cardiovascular:  Negative for chest pain, palpitations and leg swelling. Gastrointestinal:  Negative for abdominal distention, abdominal pain, constipation, diarrhea, nausea and vomiting. Endocrine: Negative for polydipsia, polyphagia and polyuria. Genitourinary:  Negative for decreased urine volume, difficulty urinating, dysuria, flank pain, frequency, hematuria and urgency. Musculoskeletal:  Negative for arthralgias, back pain, gait problem, joint swelling, myalgias and neck pain. Skin:  Negative for color change, pallor and rash. Neurological:  Negative for dizziness, syncope, weakness, light-headedness, numbness and headaches. Hematological:  Negative for adenopathy. Psychiatric/Behavioral:  Negative for behavioral problems, self-injury and sleep disturbance. The patient is not nervous/anxious. Prior to Visit Medications    Medication Sig Taking?  Authorizing Provider   lisinopril (PRINIVIL;ZESTRIL) 10 MG tablet TAKE 1 TABLET BY MOUTH EVERY DAY Yes MARY Dallas CNP   acetaminophen (TYLENOL) 325 MG tablet Take 325 mg by mouth as needed for Pain  Patient not taking: No sig reported  Historical Provider, MD   ibuprofen (ADVIL;MOTRIN) 200 MG tablet Take 200 mg by mouth every 6 hours as needed for Pain  Patient not taking: No sig reported  Historical Provider, MD        Allergies   Allergen Reactions    Zithromax [Azithromycin Dihydrate] Hives       Past Medical History:   Diagnosis Date    Heart murmur of         Past Surgical History:   Procedure Laterality Date    SHOULDER SURGERY      TESTICLE SURGERY         Social History     Socioeconomic History    Marital status: Single     Spouse name: Not on file    Number of children: Not on file    Years of education: Not on file    Highest education level: Not on file   Occupational History    Not on file   Tobacco Use    Smoking status: Never    Smokeless tobacco: Never   Vaping Use    Vaping Use: Never used   Substance and Sexual Activity    Alcohol use: Yes    Drug use: No    Sexual activity: Not Currently     Partners: Female   Other Topics Concern    Not on file   Social History Narrative    Not on file     Social Determinants of Health     Financial Resource Strain: Low Risk     Difficulty of Paying Living Expenses: Not hard at all   Food Insecurity: No Food Insecurity    Worried About Running Out of Food in the Last Year: Never true    Ran Out of Food in the Last Year: Never true   Transportation Needs: Not on file   Physical Activity: Not on file   Stress: Not on file   Social Connections: Not on file   Intimate Partner Violence: Not on file   Housing Stability: Not on file        Family History   Problem Relation Age of Onset    High Blood Pressure Mother     High Blood Pressure Father        ADVANCE DIRECTIVE: N, <no information>    Vitals:    22 1547   BP: 128/62   Site: Left Upper Arm   Position: Sitting   Cuff Size: Large Adult   Pulse: 88   Resp: 16   Temp: 97.9 °F (36.6 °C)   TempSrc: Temporal   SpO2: 99%   Weight: 186 lb (84.4 kg)   Height: 6' (1.829 m)     Estimated body mass index is 25.23 kg/m² as calculated from the following:    Height as of this encounter: 6' (1.829 m). Weight as of this encounter: 186 lb (84.4 kg). Physical Exam  Vitals reviewed. Constitutional:       General: He is not in acute distress. Appearance: Normal appearance.  He is well-developed. HENT:      Head: Normocephalic. Right Ear: External ear normal.      Left Ear: External ear normal.      Nose: Nose normal.      Right Sinus: No maxillary sinus tenderness. Left Sinus: No maxillary sinus tenderness. Mouth/Throat:      Pharynx: Uvula midline. Neck:      Trachea: Trachea normal.   Cardiovascular:      Rate and Rhythm: Normal rate and regular rhythm. Heart sounds: Normal heart sounds. No murmur heard. Pulmonary:      Effort: Pulmonary effort is normal. No respiratory distress. Breath sounds: Normal breath sounds. No decreased breath sounds, wheezing, rhonchi or rales. Chest:      Chest wall: No tenderness. Abdominal:      General: There is no distension. Palpations: Abdomen is soft. There is no mass. Tenderness: There is no abdominal tenderness. Musculoskeletal:         General: No tenderness or deformity. Normal range of motion. Cervical back: Normal range of motion and neck supple. Lymphadenopathy:      Cervical: No cervical adenopathy. Skin:     General: Skin is warm and dry. Neurological:      Mental Status: He is alert and oriented to person, place, and time. Motor: No abnormal muscle tone. Coordination: Coordination normal.      Gait: Gait normal.   Psychiatric:         Mood and Affect: Mood normal.         Behavior: Behavior normal.         Thought Content: Thought content normal.         Judgment: Judgment normal.       No flowsheet data found. Lab Results   Component Value Date/Time    CHOL 180 05/10/2022 08:52 AM    TRIG 113 05/10/2022 08:52 AM    HDL 51 05/10/2022 08:52 AM    LDLCALC 106 05/10/2022 08:52 AM    GLUCOSE 83 08/02/2022 05:00 PM       The ASCVD Risk score (Tanya Benson et al., 2013) failed to calculate for the following reasons:     The 2013 ASCVD risk score is only valid for ages 36 to 78    Immunization History   Administered Date(s) Administered    DTaP vaccine 2000, 2000, 01/15/2002, 03/04/2003, 08/14/2007    Hep B/Hib (Comvax) 2001, 01/15/2002    Hepatitis B Ped/Adol (Engerix-B, Recombivax HB) 2001    Hib (HbOC) 2001, 03/04/2003    MMR 09/03/2002, 08/14/2007    Meningococcal MCV4P (Menactra) 07/23/2014, 08/06/2019    Polio IPV (IPOL) 2001, 2001, 09/03/2002, 08/14/2007    Tdap (Boostrix, Adacel) 07/23/2014, 06/01/2021    Varicella (Varivax) 05/22/2003       Health Maintenance   Topic Date Due    COVID-19 Vaccine (1) Never done    Varicella vaccine (2 of 2 - 2-dose childhood series) 04/30/2005    HPV vaccine (1 - Male 2-dose series) Never done    HIV screen  Never done    Hepatitis C screen  Never done    Flu vaccine (1) 09/01/2022    Depression Screen  05/09/2023    DTaP/Tdap/Td vaccine (8 - Td or Tdap) 06/01/2031    Hepatitis B vaccine  Completed    Hib vaccine  Completed    Meningococcal (ACWY) vaccine  Completed    Hepatitis A vaccine  Aged Out    Pneumococcal 0-64 years Vaccine  Aged Out       [address] to work without restrictions. Annual physical exam  Return in about 1 year (around 8/23/2023).          --MARY Bess - CNP

## 2023-01-09 RX ORDER — LISINOPRIL 10 MG/1
TABLET ORAL
Qty: 30 TABLET | Refills: 11 | Status: SHIPPED | OUTPATIENT
Start: 2023-01-09

## 2023-01-09 NOTE — TELEPHONE ENCOUNTER
Last visit- 8/23/2022  Next visit- Visit date not found    Requested Prescriptions     Pending Prescriptions Disp Refills    lisinopril (PRINIVIL;ZESTRIL) 10 MG tablet 30 tablet 11     Sig: TAKE 1 TABLET BY MOUTH EVERY DAY

## 2024-01-23 RX ORDER — LISINOPRIL 20 MG/1
20 TABLET ORAL DAILY
Qty: 90 TABLET | Refills: 1 | Status: CANCELLED | OUTPATIENT
Start: 2024-01-23

## 2024-01-23 RX ORDER — LISINOPRIL 10 MG/1
10 TABLET ORAL DAILY
Qty: 30 TABLET | Refills: 0 | Status: SHIPPED | OUTPATIENT
Start: 2024-01-23

## 2024-01-23 RX ORDER — LISINOPRIL 10 MG/1
TABLET ORAL
Qty: 90 TABLET | Refills: 0 | Status: SHIPPED | OUTPATIENT
Start: 2024-01-23 | End: 2024-02-23

## 2024-01-23 NOTE — TELEPHONE ENCOUNTER
[facility] [address]. Refill of BP medication. He is not in [place] anymore.     Will also need a 90 day supply sent to [facility] mail order. Informed him to make sure he makes a yearly appointment when he is back home. He is going to [place].

## 2024-02-28 RX ORDER — LISINOPRIL 10 MG/1
10 TABLET ORAL DAILY
Qty: 30 TABLET | Refills: 0 | OUTPATIENT
Start: 2024-02-28

## 2024-02-29 RX ORDER — LISINOPRIL 10 MG/1
10 TABLET ORAL DAILY
Qty: 30 TABLET | Refills: 0 | OUTPATIENT
Start: 2024-02-29

## 2024-03-27 ENCOUNTER — TELEPHONE (OUTPATIENT)
Dept: FAMILY MEDICINE CLINIC | Age: 24
End: 2024-03-27

## 2024-03-27 NOTE — TELEPHONE ENCOUNTER
----- Message from Nieves Lowery sent at 3/27/2024  9:18 AM EDT -----  Subject: Appointment Request    Reason for Call: Established Patient Appointment needed: Semi-Routine Skin   Problem    QUESTIONS    Reason for appointment request? No appointments available during search     Additional Information for Provider? pt states he has a rash on thigh has   tried treating it but nothing works, pt is 1.5 hrs away but will need in   person visit. Please return call to schedule and also states he is due for   BP refill as well   ---------------------------------------------------------------------------  --------------  CALL BACK INFO  [phone]; OK to leave message on voicemail  ---------------------------------------------------------------------------  --------------  SCRIPT ANSWERS

## 2024-03-27 NOTE — TELEPHONE ENCOUNTER
Spoke with patient. He is needing a physical scheduled. The rash has been there awhile and is OK waiting to be seen as we have no openings this week with any provider.     Patient is scheduled for 4/9/2024 per his request.

## 2024-04-04 ENCOUNTER — OFFICE VISIT (OUTPATIENT)
Dept: FAMILY MEDICINE CLINIC | Age: 24
End: 2024-04-04
Payer: COMMERCIAL

## 2024-04-04 VITALS
HEART RATE: 82 BPM | WEIGHT: 197 LBS | DIASTOLIC BLOOD PRESSURE: 76 MMHG | TEMPERATURE: 99 F | BODY MASS INDEX: 26.72 KG/M2 | OXYGEN SATURATION: 98 % | SYSTOLIC BLOOD PRESSURE: 120 MMHG

## 2024-04-04 DIAGNOSIS — B35.4 TINEA CORPORIS: Primary | ICD-10-CM

## 2024-04-04 PROCEDURE — 99213 OFFICE O/P EST LOW 20 MIN: CPT | Performed by: NURSE PRACTITIONER

## 2024-04-04 RX ORDER — KETOCONAZOLE 20 MG/G
CREAM TOPICAL 2 TIMES DAILY
Qty: 60 G | Refills: 1 | Status: SHIPPED | OUTPATIENT
Start: 2024-04-04

## 2024-04-04 ASSESSMENT — ENCOUNTER SYMPTOMS
SINUS PRESSURE: 0
SHORTNESS OF BREATH: 0
SINUS PAIN: 0
WHEEZING: 0
EYE PAIN: 0
CHEST TIGHTNESS: 0
NAIL CHANGES: 0
NAUSEA: 0
VOMITING: 0
RHINORRHEA: 0
SORE THROAT: 0
COUGH: 0

## 2024-04-04 NOTE — PROGRESS NOTES
Rich Clemons   23 y.o.  male  [id number]      Chief Complaint   Patient presents with    Skin Problem     Spot on leg would like checked-left-been there for a month or 2 per pt getting bigger        Subjective:  23 y.o.male is here for a follow up. He has the following chronic/acute medical problems:There is no problem list on file for this patient.      Rash  This is a new problem. The current episode started more than 1 month ago (1  1/2 months ago). The problem is unchanged. Location: left upper thigh. The rash is characterized by redness and scaling. He was exposed to nothing. Pertinent negatives include no anorexia, congestion, cough, diarrhea, eye pain, facial edema, fatigue, fever, joint pain, nail changes, rhinorrhea, shortness of breath, sore throat or vomiting. Treatments tried: OTC ring worm medication for a while and then forgot about it. The treatment provided no relief.       Review of Systems   Constitutional:  Negative for appetite change, chills, fatigue and fever.   HENT:  Negative for congestion, ear pain, postnasal drip, rhinorrhea, sinus pressure, sinus pain, sneezing and sore throat.    Eyes:  Negative for pain.   Respiratory:  Negative for cough, chest tightness, shortness of breath and wheezing.    Cardiovascular:  Negative for chest pain and palpitations.   Gastrointestinal:  Negative for anorexia, diarrhea, nausea and vomiting.   Musculoskeletal:  Negative for joint pain.   Skin:  Positive for rash. Negative for nail changes.   Neurological:  Negative for dizziness, light-headedness and headaches.       Current Outpatient Medications   Medication Sig Dispense Refill    ketoconazole (NIZORAL) 2 % cream Apply topically 2 times daily X 10 to 14 days 60 g 1    lisinopril (PRINIVIL;ZESTRIL) 10 MG tablet Take 1 tablet by mouth daily 30 tablet 0     No current facility-administered medications for this visit.        Past medical, family,surgical history reviewed today.     Objective:  BP

## 2024-05-20 RX ORDER — LISINOPRIL 10 MG/1
10 TABLET ORAL DAILY
Qty: 90 TABLET | Refills: 0 | Status: SHIPPED | OUTPATIENT
Start: 2024-05-20

## 2024-05-31 ENCOUNTER — OFFICE VISIT (OUTPATIENT)
Dept: FAMILY MEDICINE CLINIC | Age: 24
End: 2024-05-31
Payer: COMMERCIAL

## 2024-05-31 VITALS
SYSTOLIC BLOOD PRESSURE: 118 MMHG | HEART RATE: 73 BPM | OXYGEN SATURATION: 99 % | WEIGHT: 201.13 LBS | RESPIRATION RATE: 18 BRPM | BODY MASS INDEX: 27.24 KG/M2 | TEMPERATURE: 97.3 F | DIASTOLIC BLOOD PRESSURE: 74 MMHG | HEIGHT: 72 IN

## 2024-05-31 DIAGNOSIS — I10 PRIMARY HYPERTENSION: ICD-10-CM

## 2024-05-31 DIAGNOSIS — Z00.00 ENCOUNTER FOR WELL ADULT EXAM WITHOUT ABNORMAL FINDINGS: Primary | ICD-10-CM

## 2024-05-31 PROCEDURE — 3074F SYST BP LT 130 MM HG: CPT

## 2024-05-31 PROCEDURE — 99395 PREV VISIT EST AGE 18-39: CPT

## 2024-05-31 PROCEDURE — 3078F DIAST BP <80 MM HG: CPT

## 2024-05-31 SDOH — ECONOMIC STABILITY: HOUSING INSECURITY
IN THE LAST 12 MONTHS, WAS THERE A TIME WHEN YOU DID NOT HAVE A STEADY PLACE TO SLEEP OR SLEPT IN A SHELTER (INCLUDING NOW)?: NO

## 2024-05-31 SDOH — ECONOMIC STABILITY: FOOD INSECURITY: WITHIN THE PAST 12 MONTHS, YOU WORRIED THAT YOUR FOOD WOULD RUN OUT BEFORE YOU GOT MONEY TO BUY MORE.: NEVER TRUE

## 2024-05-31 SDOH — ECONOMIC STABILITY: INCOME INSECURITY: HOW HARD IS IT FOR YOU TO PAY FOR THE VERY BASICS LIKE FOOD, HOUSING, MEDICAL CARE, AND HEATING?: NOT HARD AT ALL

## 2024-05-31 SDOH — ECONOMIC STABILITY: FOOD INSECURITY: WITHIN THE PAST 12 MONTHS, THE FOOD YOU BOUGHT JUST DIDN'T LAST AND YOU DIDN'T HAVE MONEY TO GET MORE.: NEVER TRUE

## 2024-05-31 ASSESSMENT — ENCOUNTER SYMPTOMS
COLOR CHANGE: 0
ABDOMINAL PAIN: 0
ABDOMINAL DISTENTION: 0
EYE PAIN: 0
CHEST TIGHTNESS: 0
DIARRHEA: 0
NAUSEA: 0
BACK PAIN: 0
WHEEZING: 0
VOMITING: 0
SINUS PRESSURE: 0
SORE THROAT: 0
COUGH: 0
SHORTNESS OF BREATH: 0

## 2024-05-31 ASSESSMENT — PATIENT HEALTH QUESTIONNAIRE - PHQ9
1. LITTLE INTEREST OR PLEASURE IN DOING THINGS: NOT AT ALL
2. FEELING DOWN, DEPRESSED OR HOPELESS: NOT AT ALL
SUM OF ALL RESPONSES TO PHQ QUESTIONS 1-9: 0
SUM OF ALL RESPONSES TO PHQ9 QUESTIONS 1 & 2: 0
SUM OF ALL RESPONSES TO PHQ QUESTIONS 1-9: 0

## 2024-05-31 NOTE — PROGRESS NOTES
Well Adult Note  Name: Rich Clemons Today’s Date: 2024   MRN: [id number] Sex: Male   Age: 24 y.o. Ethnicity: Non- / Non    : 2000 Race: White (non-)      Rich Clemons is here for well adult exam.  History:  HTN- Has been on lisinopril for a few years. BP have been well controlled.  No side effects on medication.    No concerns today.     Review of Systems   Constitutional:  Negative for activity change, appetite change, chills, fatigue, fever and unexpected weight change.   HENT:  Negative for congestion, ear pain, hearing loss, postnasal drip, sinus pressure and sore throat.    Eyes:  Negative for pain and visual disturbance.   Respiratory:  Negative for cough, chest tightness, shortness of breath and wheezing.    Cardiovascular:  Negative for chest pain, palpitations and leg swelling.   Gastrointestinal:  Negative for abdominal distention, abdominal pain, diarrhea, nausea and vomiting.   Genitourinary:  Negative for difficulty urinating, dysuria, flank pain, frequency, scrotal swelling, testicular pain and urgency.   Musculoskeletal:  Negative for arthralgias, back pain, gait problem, joint swelling, myalgias, neck pain and neck stiffness.   Skin:  Negative for color change and rash.   Neurological:  Negative for dizziness, weakness, light-headedness, numbness and headaches.   Psychiatric/Behavioral:  Negative for behavioral problems, dysphoric mood, self-injury, sleep disturbance and suicidal ideas. The patient is not nervous/anxious.        Allergies   Allergen Reactions    Zithromax [Azithromycin Dihydrate] Hives         Prior to Visit Medications    Not on File         Past Medical History:   Diagnosis Date    Heart murmur of         Past Surgical History:   Procedure Laterality Date    SHOULDER SURGERY      TESTICLE SURGERY           Family History   Problem Relation Age of Onset    High Blood Pressure Mother     High Blood Pressure Father        Social History

## 2024-05-31 NOTE — PATIENT INSTRUCTIONS
hands, brush your teeth twice a day, and wear a seat belt in the car.   Where can you learn more?  Go to [email or web address].[email or web address] and enter P072 to learn more about \"Well Visit, Ages 18 to 65: Care Instructions.\"  Current as of: August 6, 2023               Content Version: 14.0  © 6503-5157 [employer].   Care instructions adapted under license by [employer]. If you have questions about a medical condition or this instruction, always ask your healthcare professional. [employer] disclaims any warranty or liability for your use of this information.           Well Visit, Ages 18 to 65: Care Instructions  Well visits can help you stay healthy. Your doctor has checked your overall health and may have suggested ways to take good care of yourself. Your doctor also may have recommended tests. You can help prevent illness with healthy eating, good sleep, vaccinations, regular exercise, and other steps.    Get the tests that you and your doctor decide on. Depending on your age and risks, examples might include screening for diabetes; hepatitis C; HIV; and cervical, breast, lung, and colon cancer. Screening helps find diseases before any symptoms appear.   Eat healthy foods. Choose fruits, vegetables, whole grains, lean protein, and low-fat dairy foods. Limit saturated fat and reduce salt.     Limit alcohol. Men should have no more than 2 drinks a day. Women should have no more than 1. For some people, no alcohol is the best choice.   Exercise. Get at least 30 minutes of exercise on most days of the week. Walking can be a good choice.     Reach and stay at your healthy weight. This will lower your risk for many health problems.   Take care of your mental health. Try to stay connected with friends, family, and community, and find ways to manage stress.     If you're feeling depressed or hopeless, talk to someone. A counselor can help. If you don't have a counselor, talk to your doctor.

## 2024-06-24 ENCOUNTER — TELEPHONE (OUTPATIENT)
Dept: FAMILY MEDICINE CLINIC | Age: 24
End: 2024-06-24

## 2024-06-24 NOTE — TELEPHONE ENCOUNTER
Patient called to give blood pressure readings. Pt states prior to medications readings are 150's over 90's. Pt denies any headaches, sob or chest pain. Pt states after a couple hrs with his Lisinopril 10mg readings 120's over 70's.

## 2024-06-25 NOTE — TELEPHONE ENCOUNTER
Discussed with patient Paramjit Macindy's CNP  advice with Blood pressures and medications  , patient agreed to the plan and voiced no other concerns today while on this call .

## 2024-07-31 NOTE — PROGRESS NOTES
[facility]  OUTPATIENT OCCUPATIONAL THERAPY  Daily Note  [facility]    Time In: 3510  Time Out: 1600  Minutes: 30  Timed Code Treatment Minutes: 30 Minutes             Date: 2019  Patient Name: Richmond Saravia        CSN: [id number]   : 2000  (17 y.o.)  Gender: male   Referring Practitioner: Dr. Marcos Case  Diagnosis: shoulder instability, right M25.311          General:  OT Visit Information  Onset Date: 19  OT Insurance Information: [facility] - unlimited visits  Total # of Visits to Date: 15  Certification Period Expiration Date: 19  Progress Note Counter: PN completed on 3/8 5/10 for PN  Comments: returns to referring provider on        Restrictions/Precautions:       Position Activity Restriction  Other position/activity restrictions: no lifting with right arm; right shoulder surgery 19; follow Dr. Christina Blackburn protocol         Subjective:  Subjective: States that he had some soreness yesterday but he doesn't know what the cause was. States that he didn't have pain on Friday or Saturday when he did the new exercises.           Pain:  Patient Currently in Pain: Denies       Objective:     Upper Extremity Function  UE Strengthing: blueberry band - maci x 25 reps with right UE, lat pull down with scapular retraction x 20 reps; biodex at 60 speed x 3 minutes forward and 3 minutes backward'; total gym on level 2 - facing pulleys and facing away from pulleys x 10 reps each, attempted to do pull downs while supine, but it was too difficult for patient; prone over large physioball - forward walking hands forward to addres scapular stabliziation/strengthening, lifted left UE off floor while prone on large physioball x 5 reps; looped orange theraband - pulled up at angle, across at chest, and down at angle x 20 reps each with right UE; lime green theraband - standing riivalid x 15 reps in each direction Activity Tolerance: Additional Comments:  Tolerated treatment well    Assessment:  Assessment: Pt. progressing well toward goals    Patient Education:  Patient Education: to ask physician specific questions that he has regarding restrictions and therapy orders            Plan:  Plan Comment: continue per POC as established  Specific instructions for Next Treatment: advance per Dr. Milagros Muhammad protocol - isotonic strengthening and ROM                 Floriny Boas, OTR/L #[id number] [phone]

## 2024-10-15 RX ORDER — LISINOPRIL 10 MG/1
10 TABLET ORAL DAILY
Qty: 90 TABLET | Refills: 0 | Status: SHIPPED | OUTPATIENT
Start: 2024-10-15

## 2024-10-15 NOTE — TELEPHONE ENCOUNTER
This medication refill is regarding a telephone request.  Refill requested by patient.    Requested Prescriptions      No prescriptions requested or ordered in this encounter       Date of last visit: 5/31/2024  Date of next visit: Visit date not found  Date of last refill: 05/20/24  Pharmacy Name: [facility] [place]    Last Lipid Panel:    Lab Results   Component Value Date/Time    CHOL 180 05/10/2022 08:52 AM    TRIG 113 05/10/2022 08:52 AM    HDL 51 05/10/2022 08:52 AM     Last CMP:   Lab Results   Component Value Date     08/02/2022    K 3.9 08/02/2022     08/02/2022    CO2 24 08/02/2022    BUN 19 08/02/2022    CREATININE 1.2 08/02/2022    GLUCOSE 83 08/02/2022    CALCIUM 9.7 08/02/2022    BILITOT 0.7 05/10/2022    ALKPHOS 102 05/10/2022    AST 21 05/10/2022    ALT 12 05/10/2022    LABGLOM 76 (A) 08/02/2022       Last Thyroid:    Lab Results   Component Value Date    TSH 2.120 05/10/2022     Last Hemoglobin A1C:  No results found for: \"LABA1C\"    Rx verified, ordered and set to EP.

## 2025-01-22 RX ORDER — LISINOPRIL 10 MG/1
10 TABLET ORAL DAILY
Qty: 90 TABLET | Refills: 0 | Status: SHIPPED | OUTPATIENT
Start: 2025-01-22

## 2025-01-22 NOTE — TELEPHONE ENCOUNTER
This medication refill is regarding a electronic request.  Refill requested by patient.    Requested Prescriptions     Pending Prescriptions Disp Refills    lisinopril (PRINIVIL;ZESTRIL) 10 MG tablet [Pharmacy Med Name: Lisinopril Oral Tablet 10 MG] 90 tablet 0     Sig: TAKE 1 TABLET BY MOUTH EVERY DAY       Date of last visit: 5/31/2024  Date of next visit: Visit date not found  Date of last refill: 10/15/2024  Pharmacy Name: [facility] [place]

## 2025-01-22 NOTE — TELEPHONE ENCOUNTER
Patient called asking for refill of medication Lisinopril. Advised that this was routed to provider.

## 2025-05-03 ENCOUNTER — HOSPITAL ENCOUNTER (EMERGENCY)
Age: 25
Discharge: HOME | End: 2025-05-03
Payer: COMMERCIAL

## 2025-05-03 VITALS
SYSTOLIC BLOOD PRESSURE: 131 MMHG | BODY MASS INDEX: 23.7 KG/M2 | HEIGHT: 72 IN | DIASTOLIC BLOOD PRESSURE: 85 MMHG | TEMPERATURE: 97.6 F | RESPIRATION RATE: 18 BRPM | WEIGHT: 175 LBS | HEART RATE: 73 BPM | OXYGEN SATURATION: 99 %

## 2025-05-03 DIAGNOSIS — F32.A DEPRESSION, UNSPECIFIED DEPRESSION TYPE: Primary | ICD-10-CM

## 2025-05-03 DIAGNOSIS — F10.929 ACUTE ALCOHOLIC INTOXICATION WITH COMPLICATION: ICD-10-CM

## 2025-05-03 LAB
ALBUMIN SERPL BCG-MCNC: 5.1 G/DL (ref 3.4–4.9)
ALP SERPL-CCNC: 84 U/L (ref 40–129)
ALT SERPL W/O P-5'-P-CCNC: 19 U/L (ref 10–50)
AMPHETAMINES UR QL SCN: NEGATIVE
ANION GAP SERPL CALC-SCNC: 14 MEQ/L (ref 8–16)
APAP SERPL-MCNC: < 5 UG/ML (ref 10–30)
AST SERPL-CCNC: 24 U/L (ref 10–50)
BACTERIA URNS QL MICRO: ABNORMAL /HPF
BARBITURATES UR QL SCN: NEGATIVE
BASOPHILS ABSOLUTE: 0.1 THOU/MM3 (ref 0–0.1)
BASOPHILS NFR BLD AUTO: 1.1 %
BENZODIAZ UR QL SCN: NEGATIVE
BILIRUB CONJ SERPL-MCNC: < 0.1 MG/DL (ref 0–0.2)
BILIRUB SERPL-MCNC: < 0.2 MG/DL (ref 0.3–1.2)
BILIRUB UR QL STRIP.AUTO: NEGATIVE
BUN SERPL-MCNC: 9 MG/DL (ref 8–23)
BZE UR QL SCN: NEGATIVE
CALCIUM SERPL-MCNC: 9.1 MG/DL (ref 8.6–10)
CANNABINOIDS UR QL SCN: NEGATIVE
CASTS #/AREA URNS LPF: ABNORMAL /LPF
CASTS 2: ABNORMAL /LPF
CHARACTER UR: CLEAR
CHLORIDE SERPL-SCNC: 109 MEQ/L (ref 98–111)
CO2 SERPL-SCNC: 22 MEQ/L (ref 22–29)
COLOR, UA: YELLOW
CREAT SERPL-MCNC: 0.8 MG/DL (ref 0.7–1.2)
CRYSTALS URNS MICRO: ABNORMAL
DEPRECATED RDW RBC AUTO: 44.1 FL (ref 35–45)
EOSINOPHIL NFR BLD AUTO: 0.9 %
EOSINOPHILS ABSOLUTE: 0.1 THOU/MM3 (ref 0–0.4)
EPITHELIAL CELLS, UA: ABNORMAL /HPF
ERYTHROCYTE [DISTWIDTH] IN BLOOD BY AUTOMATED COUNT: 13.6 % (ref 11.5–14.5)
ETHANOL SERPL-MCNC: 0.08 % (ref 0–0.08)
ETHANOL SERPL-MCNC: 0.1 % (ref 0–0.08)
ETHANOL SERPL-MCNC: 0.2 % (ref 0–0.08)
FENTANYL: NEGATIVE
GFR SERPL CREATININE-BSD FRML MDRD: > 90 ML/MIN/1.73M2
GLUCOSE SERPL-MCNC: 94 MG/DL (ref 74–109)
GLUCOSE UR QL STRIP.AUTO: NEGATIVE MG/DL
HCT VFR BLD AUTO: 45.7 % (ref 42–52)
HGB BLD-MCNC: 14.9 GM/DL (ref 14–18)
HGB UR QL STRIP.AUTO: NEGATIVE
IMM GRANULOCYTES # BLD AUTO: 0.01 THOU/MM3 (ref 0–0.07)
IMM GRANULOCYTES NFR BLD AUTO: 0.2 %
KETONES UR QL STRIP.AUTO: NEGATIVE
LYMPHOCYTES ABSOLUTE: 1.9 THOU/MM3 (ref 1–4.8)
LYMPHOCYTES NFR BLD AUTO: 29.9 %
MCH RBC QN AUTO: 28.9 PG (ref 26–33)
MCHC RBC AUTO-ENTMCNC: 32.6 GM/DL (ref 32.2–35.5)
MCV RBC AUTO: 88.6 FL (ref 80–94)
MISCELLANEOUS 2: ABNORMAL
MONOCYTES ABSOLUTE: 0.3 THOU/MM3 (ref 0.4–1.3)
MONOCYTES NFR BLD AUTO: 4.8 %
NEUTROPHILS ABSOLUTE: 4.1 THOU/MM3 (ref 1.8–7.7)
NEUTROPHILS NFR BLD AUTO: 63.1 %
NITRITE UR QL STRIP: NEGATIVE
NRBC BLD AUTO-RTO: 0 /100 WBC
OPIATES UR QL SCN: NEGATIVE
OSMOLALITY SERPL CALC.SUM OF ELEC: 287.1 MOSMOL/KG (ref 275–300)
OXYCODONE: NEGATIVE
PCP UR QL SCN: NEGATIVE
PH UR STRIP.AUTO: 5 [PH] (ref 5–9)
PLATELET # BLD AUTO: 328 THOU/MM3 (ref 130–400)
PMV BLD AUTO: 8.7 FL (ref 9.4–12.4)
POTASSIUM SERPL-SCNC: 4.2 MEQ/L (ref 3.5–5.2)
PROT SERPL-MCNC: 8.2 G/DL (ref 6.4–8.3)
PROT UR STRIP.AUTO-MCNC: NEGATIVE MG/DL
RBC # BLD AUTO: 5.16 MILL/MM3 (ref 4.7–6.1)
RBC URINE: ABNORMAL /HPF
RENAL EPI CELLS #/AREA URNS HPF: ABNORMAL /[HPF]
SALICYLATES SERPL-MCNC: < 0.5 MG/DL (ref 2–10)
SODIUM SERPL-SCNC: 145 MEQ/L (ref 135–145)
SP GR UR REFRACT.AUTO: 1.01 (ref 1–1.03)
TSH SERPL DL<=0.05 MIU/L-ACNC: 1.56 UIU/ML (ref 0.27–4.2)
UROBILINOGEN, URINE: 0.2 EU/DL (ref 0–1)
WBC # BLD AUTO: 6.5 THOU/MM3 (ref 4.8–10.8)
WBC #/AREA URNS HPF: ABNORMAL /HPF
WBC #/AREA URNS HPF: ABNORMAL /[HPF]
YEAST LIKE FUNGI URNS QL MICRO: ABNORMAL

## 2025-05-03 PROCEDURE — 80053 COMPREHEN METABOLIC PANEL: CPT

## 2025-05-03 PROCEDURE — 85025 COMPLETE CBC W/AUTO DIFF WBC: CPT

## 2025-05-03 PROCEDURE — 99283 EMERGENCY DEPT VISIT LOW MDM: CPT

## 2025-05-03 PROCEDURE — 82077 ASSAY SPEC XCP UR&BREATH IA: CPT

## 2025-05-03 PROCEDURE — 80307 DRUG TEST PRSMV CHEM ANLYZR: CPT

## 2025-05-03 PROCEDURE — 81001 URINALYSIS AUTO W/SCOPE: CPT

## 2025-05-03 PROCEDURE — 80143 DRUG ASSAY ACETAMINOPHEN: CPT

## 2025-05-03 PROCEDURE — 80179 DRUG ASSAY SALICYLATE: CPT

## 2025-05-03 PROCEDURE — 84443 ASSAY THYROID STIM HORMONE: CPT

## 2025-05-03 PROCEDURE — 36415 COLL VENOUS BLD VENIPUNCTURE: CPT

## 2025-05-03 PROCEDURE — 82248 BILIRUBIN DIRECT: CPT

## 2025-05-03 ASSESSMENT — LIFESTYLE VARIABLES
HOW MANY STANDARD DRINKS CONTAINING ALCOHOL DO YOU HAVE ON A TYPICAL DAY: 5 OR 6
HOW OFTEN DO YOU HAVE A DRINK CONTAINING ALCOHOL: 2-4 TIMES A MONTH

## 2025-05-03 ASSESSMENT — SLEEP AND FATIGUE QUESTIONNAIRES
DO YOU HAVE DIFFICULTY SLEEPING: YES
AVERAGE NUMBER OF SLEEP HOURS: 5
DO YOU USE A SLEEP AID: NO
SLEEP PATTERN: DISTURBED/INTERRUPTED SLEEP;EARLY AWAKENING

## 2025-05-03 ASSESSMENT — PAIN - FUNCTIONAL ASSESSMENT: PAIN_FUNCTIONAL_ASSESSMENT: NONE - DENIES PAIN

## 2025-05-03 NOTE — ED NOTES
Pt resting in bed at this time, no concerns noted. Call light in reach. Pt remains in [facility] safe room at this time.

## 2025-05-03 NOTE — ED NOTES
Pt continues restful and calm sitting on cart- agreeable to having a lunch tray. Denies other needs or concerns at this time.

## 2025-05-03 NOTE — PROGRESS NOTES
BAL 24 Hour Re-Assess:   Status & Exam & Behavior Support Service Tabs      Present Suicidal Behavior:      Verbal: denied    Plan:  denied    Current Suicide Risk: Low, Moderate or High: no risk (high per [facility] report)      Present Homicidal Behavior:    Verbal:  denied    Plan:  denied      Psychosis:    Hallucinations:  denied    Delusions:  none noted       Clinical Re-Assessment Summary including Current Mental State of Patient:     Initial Assessment:    Patient is a [age] year old male escorted to [facility] by [facility] under [facility] status:     Per [facility]: On May 03 at 03:50 AM , I was dispatched to a barn on [address] just outside of [place] for a male that had talked to his ex-fiance about killing himself. I arrived and found Rich Clemons in a vehicle parked behind the shed. Mr. Clemons appeared intoxicated . Mr. Clemons admitted to me that he has had many thoughts about killing himself over the past few months. Mr. Clemons stated  he planned on cutting himself also talked about his aunt who recently killed herself. Mr. Clemons stated he knows he needs to talk to someone and needs to get help. Mr. Clemons also stated he is worried about letting his family down and this was the only reason he has not tried to kill himself . Mr. Clemons agreed to let me bring him to [facility] for an evaluation.     Patient is a [age] year old single male. He resides with his parents. He is employed with [employer]. Patient and his girlfriend ended their relationship two months ago. Patient reports he was consuming alcohol this evening and reached out to his ex fiance. Patient states 'I should have just not called or said anything'. Patient reports his aunt passed recently. Patient denies history of mental health treatment. No illicit drug use is reported. Patient denies delusions/hallucinations.     Re-assessment:    Spoke to the patient who reports he was at his parents barn

## 2025-05-03 NOTE — ED TRIAGE NOTES
Pt presents to ED via PD for evaluation of suicidal thoughts and ideations. Per pt he had been texting his ex and made some suicidal comments. PD states that pt had commented to him that he wanted to cut himself this evening and bleed out in his car tonight. Pt denies plan for this RN. Pt also told PD that he has been having suicidal thoughts for the last few months, and that recently his aunt committed suicide. Pt also mentioned to PD that he recently had an engagement that fell through. Pt denies any pain, CP, or SOB. Pt placed in [facility] safe room at this time. Vitals, labs, urine obtained.

## 2025-05-03 NOTE — ED PROVIDER NOTES
[facility] EMERGENCY DEPARTMENT      EMERGENCY MEDICINE     Pt Name: Rich Clemons  MRN: [id number]  Birthdate 2000  Date of evaluation: 5/3/2025  Provider: MARY Lundberg CNP    CHIEF COMPLAINT       Chief Complaint   Patient presents with    Suicidal     HISTORY OF PRESENT ILLNESS   Rich Clemons is a pleasant 25 y.o. male who presents to the emergency department from home with c/o needing a psychiatric evaluation.  Patient was brought in by police under EM.  He has been drinking tonight.  He told his ex girlfriend that he was having thoughts of hurting himself.  Per the [facility] he had plans to cut himself and was going to bleed out.  Patient denies plan or intent but does admit to feeling kind of down today.  No psychiatric history      History is obtained from:  patient  PASTMEDICAL HISTORY     Past Medical History:   Diagnosis Date    Heart murmur of         There is no problem list on file for this patient.    SURGICAL HISTORY       Past Surgical History:   Procedure Laterality Date    SHOULDER SURGERY      TESTICLE SURGERY         CURRENT MEDICATIONS       Previous Medications    LISINOPRIL (PRINIVIL;ZESTRIL) 10 MG TABLET    TAKE 1 TABLET BY MOUTH EVERY DAY       ALLERGIES     is allergic to zithromax [azithromycin dihydrate].    FAMILY HISTORY     He indicated that the status of his mother is unknown. He indicated that the status of his father is unknown.       SOCIAL HISTORY       Social History     Tobacco Use    Smoking status: Never    Smokeless tobacco: Never   Vaping Use    Vaping status: Never Used   Substance Use Topics    Alcohol use: Yes     Alcohol/week: 14.0 standard drinks of alcohol     Types: 14 Cans of beer per week    Drug use: No       PHYSICAL EXAM       ED Triage Vitals [25 0432]   BP Systolic BP Percentile Diastolic BP Percentile Temp Temp Source Pulse Respirations SpO2   (!) 148/75 -- -- 97.6 °F (36.4 °C) Oral 98 18 98 %      Height Weight - Scale

## 2025-05-03 NOTE — ED NOTES
Pt restful on cart, denies current needs or concerns. Offered breakfast tray but declined at this time. Pt provided ice water per request.

## 2025-05-03 NOTE — ED PROVIDER NOTES
Addendum: Care was assumed at 6 AM.  Patient did achieve sobriety.  A repeat mental health assessment was done on the patient and the patient was not homicidal or suicidal.  He had plans on going home and stay with his parents.  [facility] with [facility] he states.    Final Diagnosis:   Depression  Alcohol intoxication     Star Reilly PA  05/03/25 1421

## 2025-05-03 NOTE — PROGRESS NOTES
[facility] CRISIS ASSESSMENT    PRESENT SITUATION  Chief Complaint per ED Provider or Assigned Nurse report:   suicidal ideation     Chief Complaint per Patient report  'I should have went home and not said anything'.      Chief Complaint per Collateral contact report (Identify who and if they are present with the patient or if contacted by phone)  [facility]    If collateral was not obtained why (if obtained then NA):  na    Provisional Diagnosis (ICD or DSM approved diagnosis only) : Unspecified Depressive Disorder    PRESENT Psychosis:    Hallucinations:  Denies    Delusions:  Denies    PRESENT Suicidal Behavior (Include specific information below):      Verbal:  xxxxxx    Attempt:  Denies    Access to Weapons:   Firearms    Access to the Means of self harm or harm to others identified:   xxxxx     C-SSRS Current Suicide Risk: Low, Moderate or High:    Moderate      PAST Suicidal Behavior (Include specific information below):       Verbal:  xxxxx    Attempts:   Denies    Self-Injurious/Self-Mutilation: (Specify what, how often, last time, method, etc.)   Denies    Traumatic Event Within Past 2 Weeks: (Specify)  Denies    Current Abuse:  (type, perpetrator, systems involved, injuries, etc.)  Denies    CURRENT Violence/Aggression: (Specify)   Denies    PAST Violence/Aggression: (Specify)   Denies    Risk, Psychosocial and Contextual Factors: (EXAMPLE - homeless, lack of social support, lack of family, unemployed, debt, legal, etc.): Break up with fiance    Protective Factors:  Family, employment    Current MH Treatment: Denies      Past MH Treatment or Hospitalization (Previous 6 months):   Denies     Legal Involvement: (Specify)   Denies    Housing:   Resides with parents    CPAP/Oxygen/Ambulation Difficulties Provide pertinent results HERE.  (\"See H&P\" or \"Record\" is not acceptable response):     Critical Lab Results (Provide pertinent results HERE.  \"See H&P\" or \"Record\" is not acceptable response.:

## 2025-05-08 RX ORDER — LISINOPRIL 10 MG/1
10 TABLET ORAL DAILY
Qty: 90 TABLET | Refills: 3 | Status: SHIPPED | OUTPATIENT
Start: 2025-05-08

## 2025-05-08 NOTE — TELEPHONE ENCOUNTER
This medication refill is regarding a telephone request.  Refill requested by patient.    Requested Prescriptions     Pending Prescriptions Disp Refills    lisinopril (PRINIVIL;ZESTRIL) 10 MG tablet 90 tablet 0     Sig: Take 1 tablet by mouth daily       Date of last visit: 5/31/2024  Date of next visit: Visit date not found  Date of last refill: 1/22/2025  Pharmacy Name: [facility]-patient wanting to switch pharmacies